# Patient Record
Sex: FEMALE | Race: WHITE | NOT HISPANIC OR LATINO | URBAN - METROPOLITAN AREA
[De-identification: names, ages, dates, MRNs, and addresses within clinical notes are randomized per-mention and may not be internally consistent; named-entity substitution may affect disease eponyms.]

---

## 2017-01-30 RX ORDER — CLOPIDOGREL BISULFATE 75 MG/1
75 TABLET ORAL DAILY
Qty: 90 TABLET | Refills: 1 | Status: SHIPPED | OUTPATIENT
Start: 2017-01-30 | End: 2017-07-30 | Stop reason: SDUPTHER

## 2017-04-18 ENCOUNTER — OFFICE (OUTPATIENT)
Dept: URBAN - METROPOLITAN AREA OTHER 6 | Facility: OTHER | Age: 82
End: 2017-04-18

## 2017-04-18 VITALS
HEART RATE: 85 BPM | HEIGHT: 66 IN | SYSTOLIC BLOOD PRESSURE: 142 MMHG | DIASTOLIC BLOOD PRESSURE: 76 MMHG | WEIGHT: 131 LBS

## 2017-04-18 DIAGNOSIS — Z86.010 PERSONAL HISTORY OF COLONIC POLYPS: ICD-10-CM

## 2017-04-18 DIAGNOSIS — R10.12 LEFT UPPER QUADRANT PAIN: ICD-10-CM

## 2017-04-18 DIAGNOSIS — R14.0 ABDOMINAL DISTENSION (GASEOUS): ICD-10-CM

## 2017-04-18 PROCEDURE — 99213 OFFICE O/P EST LOW 20 MIN: CPT | Performed by: INTERNAL MEDICINE

## 2017-07-31 RX ORDER — CLOPIDOGREL BISULFATE 75 MG/1
TABLET ORAL
Qty: 90 TABLET | Refills: 0 | Status: SHIPPED | OUTPATIENT
Start: 2017-07-31 | End: 2017-10-29 | Stop reason: SDUPTHER

## 2017-10-30 RX ORDER — CLOPIDOGREL BISULFATE 75 MG/1
TABLET ORAL
Qty: 90 TABLET | Refills: 0 | Status: SHIPPED | OUTPATIENT
Start: 2017-10-30 | End: 2018-01-31 | Stop reason: SDUPTHER

## 2018-01-31 RX ORDER — CLOPIDOGREL BISULFATE 75 MG/1
TABLET ORAL
Qty: 90 TABLET | Refills: 0 | Status: SHIPPED | OUTPATIENT
Start: 2018-01-31 | End: 2018-04-28 | Stop reason: SDUPTHER

## 2018-04-30 RX ORDER — CLOPIDOGREL BISULFATE 75 MG/1
TABLET ORAL
Qty: 90 TABLET | Refills: 0 | Status: SHIPPED | OUTPATIENT
Start: 2018-04-30 | End: 2018-07-29 | Stop reason: SDUPTHER

## 2018-07-18 ENCOUNTER — OFFICE VISIT (OUTPATIENT)
Dept: CARDIOLOGY | Facility: CLINIC | Age: 83
End: 2018-07-18

## 2018-07-18 VITALS
HEART RATE: 68 BPM | DIASTOLIC BLOOD PRESSURE: 70 MMHG | BODY MASS INDEX: 20.53 KG/M2 | HEIGHT: 65 IN | SYSTOLIC BLOOD PRESSURE: 160 MMHG | WEIGHT: 123.2 LBS

## 2018-07-18 DIAGNOSIS — R06.09 DYSPNEA ON EXERTION: ICD-10-CM

## 2018-07-18 DIAGNOSIS — R07.2 PRECORDIAL PAIN: ICD-10-CM

## 2018-07-18 DIAGNOSIS — I63.9 CEREBROVASCULAR ACCIDENT (CVA), UNSPECIFIED MECHANISM (HCC): Primary | ICD-10-CM

## 2018-07-18 PROCEDURE — 99214 OFFICE O/P EST MOD 30 MIN: CPT | Performed by: INTERNAL MEDICINE

## 2018-07-18 PROCEDURE — 93000 ELECTROCARDIOGRAM COMPLETE: CPT | Performed by: INTERNAL MEDICINE

## 2018-07-18 NOTE — PROGRESS NOTES
Date of Office Visit: 2018  Encounter Provider: Marlen Brand MD  Place of Service: [facility] CARDIOLOGY  Patient Name: Becky Arceo  :1935      Patient ID:  Becky Arceo is a 83 y.o. female is here for  followup for CVA.         History of Present Illness    She presented to [facility] early 2014 with difficulty moving her  legs, left being much worse than her right. She had a CT of the head done at [facility], which was unremarkable, and she was transferred emergently to [facility] where she was found to have a 3 mm lacunar fusion restriction at the right  lentiform nucleus, which was acute subacute lacunar infarct. There was a chronic ischemic  small vessel disease noted as well. This was indicated for a left lower extremity numbness  and weakness. She did have an MRA done as well, at [facility] done 2014  showing no hemodynamically significant stenosis or aneurysm in the major arteries of the  neck. She also had an MRA done of the cerebral circulation showing no stenosis or aneurysm  of the cerebral and the other circulation there seems to be fine. She went on to have a  transesophageal echocardiogram done 2014, which showed ejection fraction of 74%,  grade 1 diastolic dysfunction, severe atherosclerotic plaque in the descending aorta,  severe atherosclerotic plaque in the aortic arch with mobile plaques noted. There was no  left atrial mass, no thrombus in the left atrial appendage, no evidence of an atrioseptal  defect, and the chamber sizes were fine. She is here because of that.      She has been a vegetarian and exercises regularly. She really is not interested in taking  medications, so we had a long discussion about that.      She continues to have some mild heaviness in her legs. She is left-handed, so she has had  a little bit of difficulty with writing, but she feels like she is almost  back to  baseline. She has resumed exercise. She has a history of renal calculi and gastroesophageal reflux  disease. She has a history of hypothyroidism as well. She has had anemia in the past.      She has never smoked, and she lives with her . They have maintained high activity  levels for quite some time. She is not yet seeing a neurologist for the stroke.       She could not take her statin because she said it caused severe muscle  aching and fatigue, but she is on red yeast rice which she has been able to tolerate.       She also had stress PET study in 10/2014 which looked normal  showing no evidence of ischemia. This was done in response to chest pain.     She continues to see Dr. Lee for her gastrointestinal issues.       She had a normal stress echocardiogram done 10/20/16.    She was in the emergency department at [facility] for anterior chest pain with short windedness with activity.  There her CMP, CBC and chest x-ray are unremarkable.  Her ECG was normal.  I felt this with anxiety and dismissed to home.  She comes in here.  She has stopped many of her activities because she so short winded with activity.  In addition she continues to have heaviness across the anterior chest.  It does not radiate.  It does mostly, with activity and is somewhat better when she rests.  Her energy level is decreased.  She's otherwise taking her medications as directed.  She does have a family history of cardiovascular disease.  She used to be able to exercise on a treadmill but since May, she said she cannot exercise because she is too short winded.  She doesn't feel her heart racing or skipping.  She's had no dizziness or syncope.    Past Medical History:   Diagnosis Date   • Acute pain of left shoulder    • Anxiety    • Balance problem    • Yan's esophagus    • Fatigue    • GERD (gastroesophageal reflux disease)    • Health care maintenance    • History of EKG 08/12/2015   • Hyperlipidemia    •  Hypothyroidism    • Precordial pain    • Stroke (CMS/HCC)    • Vegetarian          Past Surgical History:   Procedure Laterality Date   • ANTERIOR AND PELVIC EXENTERATION     • BLADDER SURGERY     • BREAST BIOPSY     • BREAST SURGERY     • COLONOSCOPY N/A 8/31/2016    Procedure: COLONOSCOPY;  Surgeon: Arsalan Lee MD;  Location: [facility] ENDOSCOPY;  Service:    • ENDOSCOPY N/A 8/31/2016    Procedure: ESOPHAGOGASTRODUODENOSCOPY WITH BIOPSIES (COLD);  Surgeon: Arsalan Lee MD;  Location: [facility] ENDOSCOPY;  Service:    • HYSTERECTOMY     • TONSILLECTOMY         Current Outpatient Prescriptions on File Prior to Visit   Medication Sig Dispense Refill   • aspirin 81 MG tablet Take 1 tablet by mouth Daily.     • cetirizine (ZyrTEC) 10 MG tablet Take  by mouth.     • Cholecalciferol (VITAMIN D3) 5000 UNITS capsule capsule Take 1 capsule by mouth Daily.     • clopidogrel (PLAVIX) 75 MG tablet TAKE 1 TABLET DAILY (NEED TO CALL THE OFFICE TO MAKE AN APPOINTMENT FOR MORE FUTURE REFILLS) 90 tablet 0   • Coenzyme Q10 (COQ10 PO) Take  by mouth.     • [employer] GAS RELIEF ULTRA STRENGTH 180 MG capsule Take 180 mg by mouth As Needed.  5   • escitalopram (LEXAPRO) 5 MG tablet escitalopram 5 mg tablet   Take 1 tablet every day by oral route.     • Garlic 350 MG tablet Take  by mouth.     • Kelp 100 MG tablet Take  by mouth.     • LORazepam (ATIVAN) 1 MG tablet Take 1 tablet by mouth Daily As Needed. For anxiety     • Magnesium 100 MG capsule Take 1 capsule by mouth Daily.     • Multiple Vitamins-Minerals (ALIVE WOMENS 50+ PO) Take 1 tablet by mouth Daily.     • POTASSIUM PO Take  by mouth.     • predniSONE (DELTASONE) 20 MG tablet 1 1/2 tabs daily for 3 days, then 1 tab daily for 3 days 8 tablet 0   • Probiotic Product (PROBIOTIC DAILY) capsule Take  by mouth.     • raNITIdine (ZANTAC) 150 MG tablet Take 150 mg by mouth.     • Red Yeast Rice Extract (RED YEAST RICE PO) Take  by mouth.     • [DISCONTINUED] cyclobenzaprine (FLEXERIL) 5 MG  tablet Take  by mouth.     • [DISCONTINUED] ranitidine (ZANTAC) 150 MG tablet Take 150 mg by mouth 2 (two) times a day.       No current facility-administered medications on file prior to visit.        Social History     Social History   • Marital status:      Spouse name: N/A   • Number of children: N/A   • Years of education: N/A     Occupational History   • Not on file.     Social History Main Topics   • Smoking status: Never Smoker   • Smokeless tobacco: Never Used      Comment: caffeine use   • Alcohol use No   • Drug use: No   • Sexual activity: Defer     Other Topics Concern   • Not on file     Social History Narrative   • No narrative on file           Review of Systems   Constitution: Negative.   HENT: Negative for congestion.    Eyes: Negative for vision loss in left eye and vision loss in right eye.   Respiratory: Negative.  Negative for cough, hemoptysis, shortness of breath, sleep disturbances due to breathing, snoring, sputum production and wheezing.    Endocrine: Negative.    Hematologic/Lymphatic: Negative.    Skin: Negative for poor wound healing and rash.   Musculoskeletal: Negative for falls, gout, muscle cramps and myalgias.   Gastrointestinal: Negative for abdominal pain, diarrhea, dysphagia, hematemesis, melena, nausea and vomiting.   Neurological: Negative for excessive daytime sleepiness, dizziness, headaches, light-headedness, loss of balance, seizures and vertigo.   Psychiatric/Behavioral: Negative for depression and substance abuse. The patient is not nervous/anxious.        Procedures    ECG 12 Lead  Date/Time: 7/18/2018 12:27 PM  Performed by: TIM BORGES  Authorized by: TIM BORGES   Comparison: compared with previous ECG   Similar to previous ECG  Rhythm: sinus rhythm  T depression: V3 and V4  T flattening: V5 and V6  Clinical impression: abnormal ECG                Objective:      Vitals:    07/18/18 1209   BP: 160/70   BP Location: Right arm   Patient  "Position: Sitting   Pulse: 68   Weight: 55.9 kg (123 lb 3.2 oz)   Height: 165.1 cm (65\")     Body mass index is 20.5 kg/m².    Physical Exam   Constitutional: She is oriented to person, place, and time. She appears well-developed and well-nourished. No distress.   HENT:   Head: Normocephalic and atraumatic.   Eyes: Conjunctivae are normal. No scleral icterus.   Neck: Neck supple. No JVD present. Carotid bruit is not present. No thyromegaly present.   Cardiovascular: Normal rate, regular rhythm, S1 normal, S2 normal, normal heart sounds and intact distal pulses.   No extrasystoles are present. PMI is not displaced.  Exam reveals no gallop.    No murmur heard.  Pulses:       Carotid pulses are 2+ on the right side, and 2+ on the left side.       Radial pulses are 2+ on the right side, and 2+ on the left side.        Dorsalis pedis pulses are 2+ on the right side, and 2+ on the left side.        Posterior tibial pulses are 2+ on the right side, and 2+ on the left side.   Pulmonary/Chest: Effort normal and breath sounds normal. No respiratory distress. She has no wheezes. She has no rhonchi. She has no rales. She exhibits no tenderness.   Abdominal: Soft. Bowel sounds are normal. She exhibits no distension, no abdominal bruit and no mass. There is no tenderness.   Musculoskeletal: She exhibits no edema or deformity.   Lymphadenopathy:     She has no cervical adenopathy.   Neurological: She is alert and oriented to person, place, and time. No cranial nerve deficit.   Skin: Skin is warm and dry. No rash noted. She is not diaphoretic. No cyanosis. No pallor. Nails show no clubbing.   Psychiatric: She has a normal mood and affect. Judgment normal.   Vitals reviewed.      Lab Review:       Assessment:      Diagnosis Plan   1. Cerebrovascular accident (CVA), unspecified mechanism (CMS/HCC)       1. Stroke syndrome 06/2014, specifically 06/01/2014 or 06/02/2014. MRA shows no  abnormalities, poor circulation in the brain, and " her carotid arteries looked fine. The  stroke affected the left side. It was a right lacunar stroke. A NAHEED shows a significant  atherosclerotic plaque of the aortic arch with mobile plaques. She requires aspirin, Plavix, blood lipid control, and blood pressure control.  currently, and followup with neurology.   2. Hyperlipidemia. She cannot take Atorvastatin due to muscle pain and refuses other statins.  3. Family history of strokes in her sister.   4. Esophageal reflux, stable. H/o Yan's esophagus.  5. Chest pain, normal PET stress study 10/2014. She now has exertional chest pain with an abnormal ecg, normal stress echo 10/2016. Dyspnea on exertion with chest tightness, worsening since 5/2018, set up CTA chest and stress nuclear study.   6. Abdominal pain, seeing Dr. Lee.      Plan:       No med changes, see rik in 6 weeks.

## 2018-07-24 ENCOUNTER — HOSPITAL ENCOUNTER (OUTPATIENT)
Dept: CARDIOLOGY | Facility: HOSPITAL | Age: 83
Discharge: HOME | End: 2018-07-24
Attending: INTERNAL MEDICINE

## 2018-07-24 ENCOUNTER — HOSPITAL ENCOUNTER (OUTPATIENT)
Dept: CT IMAGING | Facility: HOSPITAL | Age: 83
Discharge: HOME | End: 2018-07-24
Attending: INTERNAL MEDICINE | Admitting: INTERNAL MEDICINE

## 2018-07-24 ENCOUNTER — TRANSCRIBE ORDERS (OUTPATIENT)
Dept: ADMINISTRATIVE | Facility: HOSPITAL | Age: 83
End: 2018-07-24

## 2018-07-24 DIAGNOSIS — Z12.39 BREAST SCREENING: Primary | ICD-10-CM

## 2018-07-24 DIAGNOSIS — R06.09 DYSPNEA ON EXERTION: ICD-10-CM

## 2018-07-24 DIAGNOSIS — R07.2 PRECORDIAL PAIN: ICD-10-CM

## 2018-07-24 LAB
BH CV NUCLEAR PRIOR STUDY: 2
BH CV STRESS BP STAGE 1: NORMAL
BH CV STRESS COMMENTS STAGE 1: NORMAL
BH CV STRESS DOSE REGADENOSON STAGE 1: 0.4
BH CV STRESS DURATION MIN STAGE 1: 0
BH CV STRESS DURATION SEC STAGE 1: 10
BH CV STRESS HR STAGE 1: 122
BH CV STRESS PROTOCOL 1: NORMAL
BH CV STRESS RECOVERY BP: NORMAL MMHG
BH CV STRESS RECOVERY HR: 107 BPM
BH CV STRESS STAGE 1: 1
CREAT BLDA-MCNC: 0.7 MG/DL (ref 0.6–1.3)
LV EF NUC BP: 67 %
MAXIMAL PREDICTED HEART RATE: 138 BPM
PERCENT MAX PREDICTED HR: 88.41 %
STRESS BASELINE BP: NORMAL MMHG
STRESS BASELINE HR: 86 BPM
STRESS PERCENT HR: 104 %
STRESS POST EXERCISE DUR SEC: 10 SEC
STRESS POST PEAK BP: NORMAL MMHG
STRESS POST PEAK HR: 122 BPM
STRESS TARGET HR: 117 BPM

## 2018-07-24 PROCEDURE — 93016 CV STRESS TEST SUPVJ ONLY: CPT | Performed by: INTERNAL MEDICINE

## 2018-07-24 PROCEDURE — 78452 HT MUSCLE IMAGE SPECT MULT: CPT | Performed by: INTERNAL MEDICINE

## 2018-07-24 PROCEDURE — 93017 CV STRESS TEST TRACING ONLY: CPT

## 2018-07-24 PROCEDURE — 0 TECHNETIUM TETROFOSMIN KIT: Performed by: INTERNAL MEDICINE

## 2018-07-24 PROCEDURE — 93018 CV STRESS TEST I&R ONLY: CPT | Performed by: INTERNAL MEDICINE

## 2018-07-24 PROCEDURE — A9502 TC99M TETROFOSMIN: HCPCS | Performed by: INTERNAL MEDICINE

## 2018-07-24 PROCEDURE — 78452 HT MUSCLE IMAGE SPECT MULT: CPT

## 2018-07-24 PROCEDURE — 25010000002 REGADENOSON 0.4 MG/5ML SOLUTION: Performed by: INTERNAL MEDICINE

## 2018-07-24 PROCEDURE — 71275 CT ANGIOGRAPHY CHEST: CPT

## 2018-07-24 PROCEDURE — 25010000002 IOPAMIDOL 61 % SOLUTION: Performed by: INTERNAL MEDICINE

## 2018-07-24 PROCEDURE — 82565 ASSAY OF CREATININE: CPT

## 2018-07-24 RX ADMIN — TETROFOSMIN 1 DOSE: 1.38 INJECTION, POWDER, LYOPHILIZED, FOR SOLUTION INTRAVENOUS at 11:49

## 2018-07-24 RX ADMIN — REGADENOSON 0.4 MG: 0.08 INJECTION, SOLUTION INTRAVENOUS at 11:48

## 2018-07-24 RX ADMIN — IOPAMIDOL 95 ML: 612 INJECTION, SOLUTION INTRAVENOUS at 13:16

## 2018-07-24 RX ADMIN — TETROFOSMIN 1 DOSE: 1.38 INJECTION, POWDER, LYOPHILIZED, FOR SOLUTION INTRAVENOUS at 10:45

## 2018-07-24 NOTE — NURSING NOTE
Dr. Byers called x ray triage and spoke with Noni Orosco RN.  She said the patient may go.  The patient was informed and told to follow up with Dr. Brand. Her IV was D/Jas and site held until hemostasis and guaze and Co Ban was applied.

## 2018-07-30 ENCOUNTER — HOSPITAL ENCOUNTER (OUTPATIENT)
Dept: MAMMOGRAPHY | Facility: HOSPITAL | Age: 83
Discharge: HOME | End: 2018-07-30
Admitting: FAMILY MEDICINE

## 2018-07-30 DIAGNOSIS — Z12.39 BREAST SCREENING: ICD-10-CM

## 2018-07-30 PROCEDURE — 77067 SCR MAMMO BI INCL CAD: CPT

## 2018-07-30 PROCEDURE — 77063 BREAST TOMOSYNTHESIS BI: CPT

## 2018-07-30 RX ORDER — CLOPIDOGREL BISULFATE 75 MG/1
75 TABLET ORAL DAILY
Qty: 90 TABLET | Refills: 1 | Status: SHIPPED | OUTPATIENT
Start: 2018-07-30 | End: 2018-10-11

## 2018-09-05 NOTE — PROGRESS NOTES
Date of Office Visit: 2018  Encounter Provider: CHEIKH Arreguin  Place of Service: [facility] CARDIOLOGY  Patient Name: Becky Arceo  :1935    Chief Complaint   Patient presents with   • Chest Pain   :     HPI: Becky Arceo is a 83 y.o. female who presents today for cardiac follow up.  She is a new patient to me and her previous records have been reviewed.  She is , very active, never smokes cigarettes, and is a vegetarian.    She has a past medical history of anxiety, GERD, Yan's esophagus, hyperlipidemia, and thyroid disease.  In 2014 she was diagnosed with a 3 mm with container fusion restriction at the right lentiform nucleus and consistent with an acute lacunar infarct and chronic ischemic small vessel disease was noted.  She had a NAHEED completed at that time which revealed an EF of 74%, grade 1 diastolic dysfunction, severe atherosclerotic plaque in the descending aorta and aortic arch with mobile plaques noted.  She was then recommended to establish care with Dr. Marlen Brand.  She was recommended to take aspirin, clopidogrel, and have good blood pressure and cholesterol control.  She has been intolerant to statin therapy in the past.  She had a normal cardiac PET scan in 2014 and normal stress echocardiogram in 2016.    She was recently evaluated in 2018 by Dr. Marlen Brand after she had presented to [facility] for anterior chest pain and shortness of breath, both new.  She was diagnosed with anxiety and dismissed to home.  She started avoiding activities in which she knew she would experience the chest pain and shortness of breath.  Dr. Brand recommended a CTA of the chest and nuclear stress study to be completed.    CTA of the chest completed 18 showed no evidence of pulmonary emboli, 3.8 cm at take easy of the ascending thoracic aorta and a tiny hiatal hernia.  Cardial perfusion  stress test completed 7/24/18 showed a small-sized infarct in the apical region and no ischemia noted with an EF of 67%.  Overall impression consistent with a low risk study.    She presents today for follow-up with her  accompanying her.  He notes over the past 3 months she started having this chest tightness and shortness of breath with exertion.  She has continued to have the symptoms and they resolve with rest.  She denies any other associated symptoms such as jaw pain, back pain, numbness, or tingling.  She was also started to notice some new onset palpitations in which she will fill a brief fast heartbeat with no other associated symptoms.  She had this in the past and started taking magnesium once again.  She has some lightheadedness, but denies syncope.  She also describes an epigastric discomfort that occurs after eating.  She has a decreased appetite because of abdominal bloating.  Her blood pressure and heart rate are both normal today.  She has been compliant with her aspirin and clopidogrel therapy.    The following portion of the patient's history were reviewed and updated as appropriate: past medical history, past surgical history, past social history, past family history, allergies, current medications, and problem list.    Past Medical History:   Diagnosis Date   • Acute pain of left shoulder    • Anxiety    • Atherosclerosis of aorta (CMS/HCC) 9/6/2018   • Balance problem    • Yan's esophagus    • Fatigue    • GERD (gastroesophageal reflux disease)    • Health care maintenance    • History of EKG 08/12/2015   • Hyperlipidemia    • Hypothyroidism    • Precordial pain    • Stroke (CMS/HCC)    • Vegetarian        Past Surgical History:   Procedure Laterality Date   • ANTERIOR AND PELVIC EXENTERATION     • BLADDER SURGERY     • BREAST BIOPSY     • BREAST SURGERY     • COLONOSCOPY N/A 8/31/2016    Procedure: COLONOSCOPY;  Surgeon: Arsalan Lee MD;  Location: [facility] ENDOSCOPY;  Service:     • ENDOSCOPY N/A 8/31/2016    Procedure: ESOPHAGOGASTRODUODENOSCOPY WITH BIOPSIES (COLD);  Surgeon: Arsalan Lee MD;  Location: [facility] ENDOSCOPY;  Service:    • HYSTERECTOMY     • TONSILLECTOMY         Social History     Social History   • Marital status:      Spouse name: N/A   • Number of children: N/A   • Years of education: N/A     Occupational History   • Not on file.     Social History Main Topics   • Smoking status: Never Smoker   • Smokeless tobacco: Never Used      Comment: caffeine use   • Alcohol use No   • Drug use: No   • Sexual activity: Defer       Family History   Problem Relation Age of Onset   • Heart failure Mother    • Stroke Sister    • Hypertension Sister    • Rheumatic fever Brother        Review of Systems   Constitution: Positive for malaise/fatigue. Negative for chills, diaphoresis, fever, night sweats, weight gain and weight loss.   HENT: Negative for hearing loss, nosebleeds, sore throat and tinnitus.    Eyes: Negative for blurred vision, double vision, pain and visual disturbance.   Cardiovascular: Positive for chest pain and dyspnea on exertion. Negative for claudication, cyanosis, irregular heartbeat, leg swelling, near-syncope, orthopnea, palpitations, paroxysmal nocturnal dyspnea and syncope.   Respiratory: Negative for cough, hemoptysis, shortness of breath, snoring and wheezing.    Endocrine: Negative for cold intolerance, heat intolerance and polyuria.   Hematologic/Lymphatic: Negative for bleeding problem. Does not bruise/bleed easily.   Skin: Negative for color change, dry skin, flushing and itching.   Musculoskeletal: Negative for falls, joint pain, joint swelling, muscle cramps, muscle weakness and myalgias.   Gastrointestinal: Positive for bloating and abdominal pain. Negative for constipation, heartburn, melena, nausea and vomiting.   Genitourinary: Negative for dysuria and hematuria.   Neurological: Negative for excessive daytime sleepiness, dizziness,  "light-headedness, loss of balance, numbness, paresthesias, seizures and vertigo.   Psychiatric/Behavioral: Negative for altered mental status, depression, memory loss and substance abuse. The patient does not have insomnia and is not nervous/anxious.    Allergic/Immunologic: Negative for environmental allergies.       Allergies   Allergen Reactions   • Amoxicillin Itching   • Cephalexin Unknown (See Comments)     Unknown     • Penicillins Unknown (See Comments)     unknown   • Promethazine    • Doxycycline Anxiety     PT had \"a bad attack\" on this medication.  Told not to take this medication again.          Current Outpatient Prescriptions:   •  aspirin 81 MG tablet, Take 1 tablet by mouth Daily., Disp: , Rfl:   •  clopidogrel (PLAVIX) 75 MG tablet, Take 1 tablet by mouth Daily., Disp: 90 tablet, Rfl: 1  •  Coenzyme Q10 (COQ10 PO), Take  by mouth., Disp: , Rfl:   •  [employer] GAS RELIEF ULTRA STRENGTH 180 MG capsule, Take 180 mg by mouth As Needed., Disp: , Rfl: 5  •  Garlic 350 MG tablet, Take  by mouth., Disp: , Rfl:   •  Kelp 100 MG tablet, Take  by mouth., Disp: , Rfl:   •  LORazepam (ATIVAN) 1 MG tablet, Take 1 tablet by mouth Daily As Needed. For anxiety, Disp: , Rfl:   •  Magnesium 100 MG capsule, Take 1 capsule by mouth Daily., Disp: , Rfl:   •  POTASSIUM PO, Take  by mouth., Disp: , Rfl:   •  Probiotic Product (PROBIOTIC DAILY) capsule, Take  by mouth., Disp: , Rfl:   •  raNITIdine (ZANTAC) 150 MG tablet, Take 150 mg by mouth., Disp: , Rfl:   •  Red Yeast Rice Extract (RED YEAST RICE PO), Take  by mouth., Disp: , Rfl:   •  ranolazine (RANEXA) 500 MG 12 hr tablet, Take 1 tablet by mouth Every 12 (Twelve) Hours., Disp: 60 tablet, Rfl: 2      Objective:     Vitals:    09/06/18 1245   BP: 110/60   Pulse: 66   Weight: 54.7 kg (120 lb 9.6 oz)   Height: 165.1 cm (65\")     Body mass index is 20.07 kg/m².    PHYSICAL EXAM:    Vitals Reviewed.   General Appearance: No acute distress, well developed and well nourished.  " Thin.  Eyes: Conjunctiva and lids: No erythema, swelling, or discharge. Sclera non-icteric.   HENT: Atraumatic, normocephalic. External eyes, ears, and nose normal. No hearing loss noted. Mucous membranes normal. Lips not cyanotic. Neck supple with no tenderness.  Respiratory: No signs of respiratory distress. Respiration rhythm and depth normal.   Clear to auscultation. No rales, crackles, rhonchi, or wheezing auscultated.   Cardiovascular:  Jugular Venous Pressure: Normal  Heart Rate and Rhythm: Normal, Heart Sounds: Normal S1 and S2. No S3 or S4 noted.  Murmurs: No murmurs noted. No rubs, thrills, or gallops.   Arterial Pulses: Carotid pulses normal. No carotid bruit noted. Posterior tibialis and dorsalis pedis pulses normal.   Lower Extremities: No edema noted.  Gastrointestinal:  Abdomen soft, non-distended, non-tender. Normal bowel sounds. No hepatomegaly.   Musculoskeletal: Normal movement of extremities  Skin and Nails: General appearance normal. No pallor, cyanosis, diaphoresis. Skin temperature normal. No clubbing of fingernails.   Psychiatric: Patient alert and oriented to person, place, and time. Speech and behavior appropriate. Normal mood and affect.       ECG 12 Lead  Date/Time: 9/6/2018 12:39 PM  Performed by: CLIFF DURON  Authorized by: CLIFF DURON   Comparison: compared with previous ECG from 7/18/2018  Similar to previous ECG  Rhythm: sinus rhythm  Rate: normal  BPM: 66  Conduction: conduction normal  ST Depression: V1-V6  T depression: III, V3, V4 and V5  QRS axis: normal  Clinical impression: abnormal ECG  Comments: 0.05 ST segment depression  T-wave abnormalities/inversions noted  EKG tracing unchanged              Assessment:       Diagnosis Plan   1. Precordial pain  Adult Transthoracic Echo Complete W/ Cont if Necessary Per Protocol   2. Dyspnea on exertion  Adult Transthoracic Echo Complete W/ Cont if Necessary Per Protocol   3. Abnormal EKG  Adult Transthoracic Echo Complete W/  Cont if Necessary Per Protocol   4. Atherosclerosis of aorta (CMS/[facility])     5. Cerebrovascular accident (CVA), unspecified mechanism (CMS/HCC)            Plan:       1.  Chest Pain and Dyspnea on Exertion: She continues to experience typical anginal symptoms of chest tightness and dyspnea upon exertion that resolves with rest.  Her recent nuclear stress test showed a possible apical infarct and no new ischemia.  Dr. Brand discussed her recent symptoms and recommendations with the patient.  She offered proceeding with a cardiac catheterization and the patient is very hesitant to have this procedure completed because of the severe plaque in her aorta which would increase her risk for possible stroke.  Dr. Brand said if she does not want to proceed with a cardiac catheterization at this time, then she would recommend medical treatment with ranolazine 500 mg one tablet twice a day (samples were provided).  The patient prefers the medical management option.  She will continue with the aspirin and clopidogrel.  In the event that she has any worsening symptoms, she needs to contact our office or present to the emergency department immediately.  We have also recommend an echocardiogram to reassess the apical abnormality noted on the stress test.    2.  Abnormal EKG: Mild ST depression and T-wave abnormalities noted on EKG in July and September, unchanged.    3.  Atherosclerosis of Aorta: Noted per NAHEED in 2014 that she had severe plaque of her descending aorta as well as the aortic root.  Dr. Brand is aware of this.    4.  CVA: Previous lacunar stroke in 2014.  She has been maintained on aspirin and clopidogrel.    5.  Further recommendations to follow based on cardiac results of echocardiogram.  Follow-up with Dr. Brand in 6 weeks.     As always, it has been a pleasure to participate in your patient's care.      Sincerely,         CHEIKH Moreno

## 2018-09-06 ENCOUNTER — OFFICE VISIT (OUTPATIENT)
Dept: CARDIOLOGY | Facility: CLINIC | Age: 83
End: 2018-09-06

## 2018-09-06 VITALS
SYSTOLIC BLOOD PRESSURE: 110 MMHG | HEART RATE: 66 BPM | DIASTOLIC BLOOD PRESSURE: 60 MMHG | BODY MASS INDEX: 20.09 KG/M2 | WEIGHT: 120.6 LBS | HEIGHT: 65 IN

## 2018-09-06 DIAGNOSIS — R94.31 ABNORMAL EKG: ICD-10-CM

## 2018-09-06 DIAGNOSIS — R06.09 DYSPNEA ON EXERTION: ICD-10-CM

## 2018-09-06 DIAGNOSIS — I70.0 ATHEROSCLEROSIS OF AORTA (HCC): ICD-10-CM

## 2018-09-06 DIAGNOSIS — R07.2 PRECORDIAL PAIN: Primary | ICD-10-CM

## 2018-09-06 DIAGNOSIS — I63.9 CEREBROVASCULAR ACCIDENT (CVA), UNSPECIFIED MECHANISM (HCC): ICD-10-CM

## 2018-09-06 PROBLEM — K22.70 BARRETT'S ESOPHAGUS: Status: ACTIVE | Noted: 2018-03-05

## 2018-09-06 PROBLEM — K21.9 GASTROESOPHAGEAL REFLUX DISEASE: Status: ACTIVE | Noted: 2018-03-05

## 2018-09-06 PROCEDURE — 99215 OFFICE O/P EST HI 40 MIN: CPT | Performed by: NURSE PRACTITIONER

## 2018-09-06 PROCEDURE — 93000 ELECTROCARDIOGRAM COMPLETE: CPT | Performed by: NURSE PRACTITIONER

## 2018-09-06 RX ORDER — RANOLAZINE 500 MG/1
500 TABLET, EXTENDED RELEASE ORAL EVERY 12 HOURS SCHEDULED
Qty: 84 TABLET | Refills: 0 | COMMUNITY
Start: 2018-09-06 | End: 2020-06-12

## 2018-09-06 RX ORDER — RANOLAZINE 500 MG/1
500 TABLET, EXTENDED RELEASE ORAL EVERY 12 HOURS SCHEDULED
Qty: 60 TABLET | Refills: 2 | Status: SHIPPED | OUTPATIENT
Start: 2018-09-06 | End: 2018-09-06 | Stop reason: SDUPTHER

## 2018-09-09 ENCOUNTER — HOSPITAL ENCOUNTER (OUTPATIENT)
Facility: HOSPITAL | Age: 83
Setting detail: OBSERVATION
Discharge: HOME | End: 2018-09-10
Attending: FAMILY MEDICINE | Admitting: FAMILY MEDICINE

## 2018-09-09 ENCOUNTER — APPOINTMENT (OUTPATIENT)
Dept: CT IMAGING | Facility: HOSPITAL | Age: 83
End: 2018-09-09

## 2018-09-09 DIAGNOSIS — R26.2 DIFFICULTY WALKING: ICD-10-CM

## 2018-09-09 DIAGNOSIS — R07.89 ATYPICAL CHEST PAIN: ICD-10-CM

## 2018-09-09 DIAGNOSIS — G45.9 TRANSIENT CEREBRAL ISCHEMIA, UNSPECIFIED TYPE: Primary | ICD-10-CM

## 2018-09-09 PROBLEM — R29.898 LEFT ARM WEAKNESS: Status: ACTIVE | Noted: 2018-09-09

## 2018-09-09 PROBLEM — Z86.73 HISTORY OF LACUNAR CEREBROVASCULAR ACCIDENT: Status: ACTIVE | Noted: 2018-09-09

## 2018-09-09 LAB
ALBUMIN SERPL-MCNC: 4.5 G/DL (ref 3.5–5.2)
ALBUMIN/GLOB SERPL: 1.7 G/DL
ALP SERPL-CCNC: 61 U/L (ref 39–117)
ALT SERPL W P-5'-P-CCNC: 14 U/L (ref 1–33)
ANION GAP SERPL CALCULATED.3IONS-SCNC: 11.9 MMOL/L
AST SERPL-CCNC: 21 U/L (ref 1–32)
BASOPHILS # BLD AUTO: 0.01 10*3/MM3 (ref 0–0.2)
BASOPHILS NFR BLD AUTO: 0.2 % (ref 0–1.5)
BILIRUB SERPL-MCNC: 0.9 MG/DL (ref 0.1–1.2)
BUN BLD-MCNC: 11 MG/DL (ref 8–23)
BUN/CREAT SERPL: 12.6 (ref 7–25)
CALCIUM SPEC-SCNC: 9.2 MG/DL (ref 8.6–10.5)
CHLORIDE SERPL-SCNC: 101 MMOL/L (ref 98–107)
CO2 SERPL-SCNC: 26.1 MMOL/L (ref 22–29)
CREAT BLD-MCNC: 0.87 MG/DL (ref 0.57–1)
DEPRECATED RDW RBC AUTO: 43.1 FL (ref 37–54)
EOSINOPHIL # BLD AUTO: 0.14 10*3/MM3 (ref 0–0.7)
EOSINOPHIL NFR BLD AUTO: 2.8 % (ref 0.3–6.2)
ERYTHROCYTE [DISTWIDTH] IN BLOOD BY AUTOMATED COUNT: 13 % (ref 11.7–13)
GFR SERPL CREATININE-BSD FRML MDRD: 62 ML/MIN/1.73
GLOBULIN UR ELPH-MCNC: 2.7 GM/DL
GLUCOSE BLD-MCNC: 83 MG/DL (ref 65–99)
HCT VFR BLD AUTO: 41.9 % (ref 35.6–45.5)
HGB BLD-MCNC: 13.9 G/DL (ref 11.9–15.5)
HOLD SPECIMEN: NORMAL
HOLD SPECIMEN: NORMAL
IMM GRANULOCYTES # BLD: 0.01 10*3/MM3 (ref 0–0.03)
IMM GRANULOCYTES NFR BLD: 0.2 % (ref 0–0.5)
LYMPHOCYTES # BLD AUTO: 1.76 10*3/MM3 (ref 0.9–4.8)
LYMPHOCYTES NFR BLD AUTO: 35.3 % (ref 19.6–45.3)
MCH RBC QN AUTO: 30.1 PG (ref 26.9–32)
MCHC RBC AUTO-ENTMCNC: 33.2 G/DL (ref 32.4–36.3)
MCV RBC AUTO: 90.7 FL (ref 80.5–98.2)
MONOCYTES # BLD AUTO: 0.35 10*3/MM3 (ref 0.2–1.2)
MONOCYTES NFR BLD AUTO: 7 % (ref 5–12)
NEUTROPHILS # BLD AUTO: 2.72 10*3/MM3 (ref 1.9–8.1)
NEUTROPHILS NFR BLD AUTO: 54.7 % (ref 42.7–76)
PLATELET # BLD AUTO: 277 10*3/MM3 (ref 140–500)
PMV BLD AUTO: 10 FL (ref 6–12)
POTASSIUM BLD-SCNC: 4.2 MMOL/L (ref 3.5–5.2)
PROT SERPL-MCNC: 7.2 G/DL (ref 6–8.5)
RBC # BLD AUTO: 4.62 10*6/MM3 (ref 3.9–5.2)
SODIUM BLD-SCNC: 139 MMOL/L (ref 136–145)
TROPONIN T SERPL-MCNC: <0.01 NG/ML (ref 0–0.03)
TROPONIN T SERPL-MCNC: <0.01 NG/ML (ref 0–0.03)
WBC NRBC COR # BLD: 4.98 10*3/MM3 (ref 4.5–10.7)
WHOLE BLOOD HOLD SPECIMEN: NORMAL
WHOLE BLOOD HOLD SPECIMEN: NORMAL

## 2018-09-09 PROCEDURE — G0378 HOSPITAL OBSERVATION PER HR: HCPCS

## 2018-09-09 PROCEDURE — 99285 EMERGENCY DEPT VISIT HI MDM: CPT

## 2018-09-09 PROCEDURE — 80053 COMPREHEN METABOLIC PANEL: CPT | Performed by: FAMILY MEDICINE

## 2018-09-09 PROCEDURE — 85025 COMPLETE CBC W/AUTO DIFF WBC: CPT | Performed by: FAMILY MEDICINE

## 2018-09-09 PROCEDURE — 84484 ASSAY OF TROPONIN QUANT: CPT | Performed by: FAMILY MEDICINE

## 2018-09-09 PROCEDURE — 25010000002 LORAZEPAM PER 2 MG: Performed by: FAMILY MEDICINE

## 2018-09-09 PROCEDURE — 84443 ASSAY THYROID STIM HORMONE: CPT | Performed by: INTERNAL MEDICINE

## 2018-09-09 PROCEDURE — 96374 THER/PROPH/DIAG INJ IV PUSH: CPT

## 2018-09-09 PROCEDURE — 93010 ELECTROCARDIOGRAM REPORT: CPT | Performed by: INTERNAL MEDICINE

## 2018-09-09 PROCEDURE — 93005 ELECTROCARDIOGRAM TRACING: CPT | Performed by: FAMILY MEDICINE

## 2018-09-09 PROCEDURE — 96361 HYDRATE IV INFUSION ADD-ON: CPT

## 2018-09-09 PROCEDURE — 84484 ASSAY OF TROPONIN QUANT: CPT | Performed by: INTERNAL MEDICINE

## 2018-09-09 PROCEDURE — 70450 CT HEAD/BRAIN W/O DYE: CPT

## 2018-09-09 RX ORDER — CLOPIDOGREL BISULFATE 75 MG/1
75 TABLET ORAL DAILY
Status: DISCONTINUED | OUTPATIENT
Start: 2018-09-09 | End: 2018-09-10 | Stop reason: HOSPADM

## 2018-09-09 RX ORDER — ASPIRIN 300 MG/1
300 SUPPOSITORY RECTAL DAILY
Status: DISCONTINUED | OUTPATIENT
Start: 2018-09-09 | End: 2018-09-10 | Stop reason: HOSPADM

## 2018-09-09 RX ORDER — SODIUM CHLORIDE 9 MG/ML
75 INJECTION, SOLUTION INTRAVENOUS CONTINUOUS
Status: DISCONTINUED | OUTPATIENT
Start: 2018-09-09 | End: 2018-09-10 | Stop reason: HOSPADM

## 2018-09-09 RX ORDER — BISACODYL 5 MG/1
5 TABLET, DELAYED RELEASE ORAL DAILY PRN
Status: DISCONTINUED | OUTPATIENT
Start: 2018-09-09 | End: 2018-09-10 | Stop reason: HOSPADM

## 2018-09-09 RX ORDER — SODIUM CHLORIDE 0.9 % (FLUSH) 0.9 %
1-10 SYRINGE (ML) INJECTION AS NEEDED
Status: DISCONTINUED | OUTPATIENT
Start: 2018-09-09 | End: 2018-09-10 | Stop reason: HOSPADM

## 2018-09-09 RX ORDER — ATORVASTATIN CALCIUM 20 MG/1
40 TABLET, FILM COATED ORAL NIGHTLY
Status: DISCONTINUED | OUTPATIENT
Start: 2018-09-09 | End: 2018-09-10

## 2018-09-09 RX ORDER — NITROGLYCERIN 0.4 MG/1
0.4 TABLET SUBLINGUAL
Status: DISCONTINUED | OUTPATIENT
Start: 2018-09-09 | End: 2018-09-10 | Stop reason: HOSPADM

## 2018-09-09 RX ORDER — RANOLAZINE 500 MG/1
500 TABLET, EXTENDED RELEASE ORAL EVERY 12 HOURS SCHEDULED
Status: DISCONTINUED | OUTPATIENT
Start: 2018-09-09 | End: 2018-09-10 | Stop reason: HOSPADM

## 2018-09-09 RX ORDER — LORAZEPAM 1 MG/1
1 TABLET ORAL DAILY PRN
Status: DISCONTINUED | OUTPATIENT
Start: 2018-09-09 | End: 2018-09-10 | Stop reason: HOSPADM

## 2018-09-09 RX ORDER — FAMOTIDINE 20 MG/1
20 TABLET, FILM COATED ORAL DAILY
Status: DISCONTINUED | OUTPATIENT
Start: 2018-09-09 | End: 2018-09-10 | Stop reason: HOSPADM

## 2018-09-09 RX ORDER — ATORVASTATIN CALCIUM 80 MG/1
80 TABLET, FILM COATED ORAL NIGHTLY
Status: DISCONTINUED | OUTPATIENT
Start: 2018-09-09 | End: 2018-09-09

## 2018-09-09 RX ORDER — CALCIUM CARBONATE 200(500)MG
2 TABLET,CHEWABLE ORAL 2 TIMES DAILY PRN
Status: DISCONTINUED | OUTPATIENT
Start: 2018-09-09 | End: 2018-09-10 | Stop reason: HOSPADM

## 2018-09-09 RX ORDER — ALPRAZOLAM 0.25 MG/1
0.5 TABLET ORAL ONCE
Status: COMPLETED | OUTPATIENT
Start: 2018-09-09 | End: 2018-09-09

## 2018-09-09 RX ORDER — SIMETHICONE 180 MG
180 CAPSULE ORAL 4 TIMES DAILY PRN
Status: DISCONTINUED | OUTPATIENT
Start: 2018-09-09 | End: 2018-09-10 | Stop reason: HOSPADM

## 2018-09-09 RX ORDER — ONDANSETRON 2 MG/ML
4 INJECTION INTRAMUSCULAR; INTRAVENOUS EVERY 6 HOURS PRN
Status: DISCONTINUED | OUTPATIENT
Start: 2018-09-09 | End: 2018-09-10 | Stop reason: HOSPADM

## 2018-09-09 RX ORDER — ACETAMINOPHEN 325 MG/1
650 TABLET ORAL EVERY 4 HOURS PRN
Status: DISCONTINUED | OUTPATIENT
Start: 2018-09-09 | End: 2018-09-10 | Stop reason: HOSPADM

## 2018-09-09 RX ORDER — ACETAMINOPHEN 650 MG/1
650 SUPPOSITORY RECTAL EVERY 4 HOURS PRN
Status: DISCONTINUED | OUTPATIENT
Start: 2018-09-09 | End: 2018-09-10 | Stop reason: HOSPADM

## 2018-09-09 RX ORDER — ONDANSETRON 4 MG/1
4 TABLET, ORALLY DISINTEGRATING ORAL EVERY 6 HOURS PRN
Status: DISCONTINUED | OUTPATIENT
Start: 2018-09-09 | End: 2018-09-10 | Stop reason: HOSPADM

## 2018-09-09 RX ORDER — ONDANSETRON 4 MG/1
4 TABLET, FILM COATED ORAL EVERY 6 HOURS PRN
Status: DISCONTINUED | OUTPATIENT
Start: 2018-09-09 | End: 2018-09-10 | Stop reason: HOSPADM

## 2018-09-09 RX ORDER — ASPIRIN 325 MG
325 TABLET ORAL DAILY
Status: DISCONTINUED | OUTPATIENT
Start: 2018-09-09 | End: 2018-09-10 | Stop reason: HOSPADM

## 2018-09-09 RX ORDER — LORAZEPAM 2 MG/ML
1 INJECTION INTRAMUSCULAR ONCE
Status: COMPLETED | OUTPATIENT
Start: 2018-09-09 | End: 2018-09-09

## 2018-09-09 RX ADMIN — CLOPIDOGREL 75 MG: 75 TABLET, FILM COATED ORAL at 20:10

## 2018-09-09 RX ADMIN — RANOLAZINE 500 MG: 500 TABLET, FILM COATED, EXTENDED RELEASE ORAL at 20:09

## 2018-09-09 RX ADMIN — ATORVASTATIN CALCIUM 40 MG: 20 TABLET, FILM COATED ORAL at 20:09

## 2018-09-09 RX ADMIN — ALPRAZOLAM 0.5 MG: 0.25 TABLET ORAL at 14:22

## 2018-09-09 RX ADMIN — SODIUM CHLORIDE 75 ML/HR: 9 INJECTION, SOLUTION INTRAVENOUS at 18:59

## 2018-09-09 RX ADMIN — FAMOTIDINE 20 MG: 20 TABLET, FILM COATED ORAL at 20:09

## 2018-09-09 RX ADMIN — ASPIRIN 325 MG: 325 TABLET ORAL at 20:10

## 2018-09-09 RX ADMIN — LORAZEPAM 1 MG: 2 INJECTION INTRAMUSCULAR; INTRAVENOUS at 14:21

## 2018-09-09 NOTE — ED NOTES
Pt states around 1130 she could no control her left arm when she was trying to comb her hair.  Pt states that her left arm feels like it is back to normal.  Pt states that she has had a stroke in the past.   Pt states that her left leg feels heavy.     Naila Nguyen, KATIA  09/09/18 [id number]

## 2018-09-09 NOTE — ED NOTES
Pt in imaging when RN entered room for specimen collection and med admin.     Alexx Man, RN  09/09/18 1430

## 2018-09-09 NOTE — PROGRESS NOTES
Discharge Planning Assessment  [facility]     Patient Name: Becky Arceo  MRN: [id number]  Today's Date: 9/9/2018    Admit Date: 9/9/2018          Discharge Needs Assessment     Row Name 09/09/18 1446       Living Environment    Lives With spouse    Name(s) of Who Lives With Patient --   Guzman    Current Living Arrangements home/apartment/condo    Duration at Residence --   41 years    Potentially Unsafe Housing Conditions --   none    Primary Care Provided by self    Provides Primary Care For no one    Caregiving Concerns --   Pt cares for herself and  helps if needed    Family Caregiver if Needed spouse    Family Caregiver Names --   Guzman    Quality of Family Relationships involved;helpful;supportive    Able to Return to Prior Arrangements yes    Living Arrangement Comments --   pt lives in a house with her        Resource/Environmental Concerns    Resource/Environmental Concerns none    Transportation Concerns --   none       Transition Planning    Patient/Family Anticipates Transition to home    Patient/Family Anticipated Services at Transition none    Transportation Anticipated family or friend will provide    can provide ride home       Discharge Needs Assessment    Readmission Within the Last 30 Days no previous admission in last 30 days    Concerns to be Addressed no discharge needs identified    Concerns Comments --   none    Equipment Currently Used at Home none    Anticipated Changes Related to Illness none    Equipment Needed After Discharge none    Outpatient/Agency/Support Group Needs --   none    Discharge Facility/Level of Care Needs --   none    Offered/Gave Vendor List yes   offered resources pt and  both deny the need for them at this time.    Patient's Choice of Community Agency(s) --   none    Current Discharge Risk --   none    Discharge Coordination/Progress --   Pt being admitted as obs and plans on returning home with her . No needs identified at  this time.            Discharge Plan     Row Name 09/09/18 1443       Plan    Patient/Family in Agreement with Plan yes    Guzman present during assessment    Plan Comments --   Pt plans on returning home with her  upon discharge from hospital.        Destination     No service coordination in this encounter.      Durable Medical Equipment     No service coordination in this encounter.      Dialysis/Infusion     No service coordination in this encounter.      Home Medical Care     No service coordination in this encounter.      Social Care     No service coordination in this encounter.                Demographic Summary    No documentation.           Functional Status    No documentation.           Psychosocial    No documentation.           Abuse/Neglect    No documentation.           Legal    No documentation.           Substance Abuse    No documentation.           Patient Forms    No documentation.         Janna Garcia RN

## 2018-09-09 NOTE — ED PROVIDER NOTES
" EMERGENCY DEPARTMENT ENCOUNTER    CHIEF COMPLAINT  Chief Complaint: abnormal coordination  History given by: patient, spouse  History limited by: nothing  Room Number: 27/27  PMD: Giovanni Jones MD  Cardiologist- Dr. Brand    HPI:  Pt is a 83 y.o. female who presents complaining of abnormal coordination of her LUE that she first noticed while brushing her hair with her LUE around 1130 today. Pt's spouse states that the pt's symptoms resolved after about 5 minutes but denies the pt having any trouble talking, facial weakness or trouble ambulating. Pt denies trouble ambulating but states that her left leg feels \"heavy\". Pt also complains of intermittent, fleeting, left, sharp lower chest discomfort that began last night. Pt states that her prior CVA was in 2014 and she presented with left hand numbness and LLE weakness. Pt is on Plavix for prior CVA and that that she took 325mg ASA x2 PTA.    Duration:  5 minutes  Onset: gradual  Timing: constant  Location: LUE  Radiation: none  Quality: abnormal coordination  Intensity/Severity: moderate  Progression: resolved  Associated Symptoms: LLE \"heaviness\", CP  Aggravating Factors: none  Alleviating Factors: none  Treatment before arrival: Pt states that she took 325mg ASA x2 with improvement of her symptoms.    PAST MEDICAL HISTORY  Active Ambulatory Problems     Diagnosis Date Noted   • Imbalance 08/31/2016   • Cerebrovascular accident (CVA) (CMS/Formerly [facility]) 10/13/2016   • Yan's esophagus 03/05/2018   • Gastroesophageal reflux disease 03/05/2018   • Atherosclerosis of aorta (CMS/Formerly [facility]) 09/06/2018   • Abnormal EKG 09/06/2018   • Precordial pain 09/06/2018   • Dyspnea on exertion 09/06/2018     Resolved Ambulatory Problems     Diagnosis Date Noted   • No Resolved Ambulatory Problems     Past Medical History:   Diagnosis Date   • Acute pain of left shoulder    • Anxiety    • Atherosclerosis of aorta (CMS/Formerly [facility]) 9/6/2018   • Balance problem    • Yan's esophagus    • " Fatigue    • GERD (gastroesophageal reflux disease)    • Health care maintenance    • History of EKG 08/12/2015   • Hyperlipidemia    • Hypothyroidism    • Precordial pain    • Stroke (CMS/HCC)    • Vegetarian        PAST SURGICAL HISTORY  Past Surgical History:   Procedure Laterality Date   • ANTERIOR AND PELVIC EXENTERATION     • BLADDER SURGERY     • BREAST BIOPSY     • BREAST SURGERY     • COLONOSCOPY N/A 8/31/2016    Procedure: COLONOSCOPY;  Surgeon: Arsalan Lee MD;  Location: [facility] ENDOSCOPY;  Service:    • ENDOSCOPY N/A 8/31/2016    Procedure: ESOPHAGOGASTRODUODENOSCOPY WITH BIOPSIES (COLD);  Surgeon: Arsalan Lee MD;  Location: [facility] ENDOSCOPY;  Service:    • HYSTERECTOMY     • TONSILLECTOMY         FAMILY HISTORY  Family History   Problem Relation Age of Onset   • Heart failure Mother    • Stroke Sister    • Hypertension Sister    • Rheumatic fever Brother        SOCIAL HISTORY  Social History     Social History   • Marital status:      Spouse name: N/A   • Number of children: N/A   • Years of education: N/A     Occupational History   • Not on file.     Social History Main Topics   • Smoking status: Never Smoker   • Smokeless tobacco: Never Used      Comment: caffeine use   • Alcohol use No   • Drug use: No   • Sexual activity: Defer     Other Topics Concern   • Not on file     Social History Narrative   • No narrative on file       ALLERGIES  Amoxicillin; Cephalexin; Penicillins; Promethazine; and Doxycycline    REVIEW OF SYSTEMS  Review of Systems   Constitutional: Negative for fever.   HENT: Negative for sore throat.    Eyes: Negative.    Respiratory: Negative for cough and shortness of breath.    Cardiovascular: Positive for chest pain (intermittent).   Gastrointestinal: Negative for abdominal pain, diarrhea and vomiting.   Genitourinary: Negative for dysuria.   Musculoskeletal: Negative for gait problem and neck pain.   Skin: Negative for rash.   Allergic/Immunologic: Negative.   "  Neurological: Positive for weakness (LLE \"heaviness\"). Negative for numbness and headaches.        (+) Abnormal coordination of LUE   Hematological: Negative.    Psychiatric/Behavioral: Negative.    All other systems reviewed and are negative.      PHYSICAL EXAM  ED Triage Vitals [09/09/18 1224]   Temp Heart Rate Resp BP SpO2   97.8 °F (36.6 °C) 72 18 163/90 100 %      Temp src Heart Rate Source Patient Position BP Location FiO2 (%)   Oral -- -- -- --       Physical Exam   Constitutional: She is oriented to person, place, and time. No distress.   Pt appears elderly   HENT:   Head: Normocephalic and atraumatic.   Eyes: Pupils are equal, round, and reactive to light. EOM are normal.   Neck: Normal range of motion. Neck supple.   Cardiovascular: Normal rate, regular rhythm and normal heart sounds.    Pulmonary/Chest: Effort normal and breath sounds normal. No respiratory distress. She exhibits no tenderness.   Abdominal: Soft. There is no tenderness. There is no rebound and no guarding.   Musculoskeletal: Normal range of motion. She exhibits no edema.   Neurological: She is alert and oriented to person, place, and time. She has normal sensation and normal strength. She displays no weakness and facial symmetry. She has a normal Finger-Nose-Finger Test and a normal Heel to Brannon Test. She shows no pronator drift. Gait normal.   Skin: Skin is warm and dry. No rash noted.   Psychiatric: Mood and affect normal.   Nursing note and vitals reviewed.      LAB RESULTS  Lab Results (last 24 hours)     Procedure Component Value Units Date/Time    CBC & Differential [[id number]] Collected:  09/09/18 1236    Specimen:  Blood Updated:  09/09/18 1323    Narrative:       The following orders were created for panel order CBC & Differential.  Procedure                               Abnormality         Status                     ---------                               -----------         ------                     CBC Auto " Differential[[id number]]        Normal              Final result                 Please view results for these tests on the individual orders.    Comprehensive Metabolic Panel [[id number]] Collected:  09/09/18 1236    Specimen:  Blood Updated:  09/09/18 1334     Glucose 83 mg/dL      BUN 11 mg/dL      Creatinine 0.87 mg/dL      Sodium 139 mmol/L      Potassium 4.2 mmol/L      Chloride 101 mmol/L      CO2 26.1 mmol/L      Calcium 9.2 mg/dL      Total Protein 7.2 g/dL      Albumin 4.50 g/dL      ALT (SGPT) 14 U/L      AST (SGOT) 21 U/L      Alkaline Phosphatase 61 U/L      Total Bilirubin 0.9 mg/dL      eGFR Non African Amer 62 mL/min/1.73      Globulin 2.7 gm/dL      A/G Ratio 1.7 g/dL      BUN/Creatinine Ratio 12.6     Anion Gap 11.9 mmol/L     Narrative:       The MDRD GFR formula is only valid for adults with stable renal function between ages 18 and 70.    Troponin [[id number]]  (Normal) Collected:  09/09/18 1236    Specimen:  Blood Updated:  09/09/18 1333     Troponin T <0.010 ng/mL     Narrative:       Troponin T Reference Ranges:  Less than 0.03 ng/mL:    Negative for AMI  0.03 to 0.09 ng/mL:      Indeterminant for AMI  Greater than 0.09 ng/mL: Positive for AMI    CBC Auto Differential [[id number]]  (Normal) Collected:  09/09/18 1236    Specimen:  Blood Updated:  09/09/18 1323     WBC 4.98 10*3/mm3      RBC 4.62 10*6/mm3      Hemoglobin 13.9 g/dL      Hematocrit 41.9 %      MCV 90.7 fL      MCH 30.1 pg      MCHC 33.2 g/dL      RDW 13.0 %      RDW-SD 43.1 fl      MPV 10.0 fL      Platelets 277 10*3/mm3      Neutrophil % 54.7 %      Lymphocyte % 35.3 %      Monocyte % 7.0 %      Eosinophil % 2.8 %      Basophil % 0.2 %      Immature Grans % 0.2 %      Neutrophils, Absolute 2.72 10*3/mm3      Lymphocytes, Absolute 1.76 10*3/mm3      Monocytes, Absolute 0.35 10*3/mm3      Eosinophils, Absolute 0.14 10*3/mm3      Basophils, Absolute 0.01 10*3/mm3      Immature Grans, Absolute 0.01 10*3/mm3           I ordered the above  labs and reviewed the results    RADIOLOGY  CT Head Without Contrast    (Results Pending)      CT head shows nothing acute.     I ordered the above noted radiological studies. Interpreted by radiologist. Discussed with radiologist (Dr. Cox). Reviewed by me in PACS.       PROCEDURES  Procedures   1302  Interval: baseline (at presentation)  1a. Level of Consciousness: 0-->Alert, keenly responsive  1b. LOC Questions: 0-->Answers both questions correctly  1c. LOC Commands: 0-->Performs both tasks correctly  2. Best Gaze: 0-->Normal  3. Visual: 0-->No visual loss  4. Facial Palsy: 0-->Normal symmetrical movements  5a. Motor Arm, Left: 0-->No drift, limb holds 90 (or 45) degrees for full 10 secs  5b. Motor Arm, Right: 0-->No drift, limb holds 90 (or 45) degrees for full 10 secs  6a. Motor Leg, Left: 0-->No drift, leg holds 30 degree position for full 5 secs  6b. Motor Leg, Right: 0-->No drift, leg holds 30 degree position for full 5 secs  7. Limb Ataxia: 0-->Absent  8. Sensory: 0-->Normal, no sensory loss  9. Best Language: 0-->No aphasia, normal  10. Dysarthria: 0-->Normal  11. Extinction and Inattention (formerly Neglect): 0-->No abnormality    Total (NIH Stroke Scale): 0    EKG           EKG time: 1325  Rhythm/Rate: sinus bradycardia, 52  P waves and TX: normal  QRS, axis: normal    ST and T waves: non-specific ST and T wave changes     Interpreted Contemporaneously by me, independently viewed  No prior for comparison    EKG           EKG time: 1354  Rhythm/Rate: sinus bradycardia, 51  P waves and TX: normal  QRS, axis: normal   ST and T waves: non-specific ST and T wave changes     Interpreted Contemporaneously by me, independently viewed  unchanged compared to prior    PROGRESS AND CONSULTS  ED Course as of Sep 09 1441   Sun Sep 09, 2018   1242 Pt st that about 1 hour ago her left arm wasn't working while she was trying to comb her hair.  Pt states I was afraid I was having another stroke  [MS]      ED Course  User Index  [MS] Yue Demarco, APRN     1309- D/w pt and family that the pt's CP is atypical for a cardiac etiology since the discomfort is fleeting. Notified pt and family that the pt has likely had a TIA. Discussed the plan to admit the pt for further evaluation of her symptoms. Pt understands and agrees with the plan, all questions answered.    1314- Ordered blood work, troponin, EKG and CT head for further evaluation.    1337- Discussed the pt's case with Dr. Marshall (neurology), who agrees with the plan to admit the pt to medicine and that the pt is not a tPA candidate since her symptoms have resolved.    1342- Placed call to [facility] for admission.     1350- Per RN, the pt is complaining of worsening CP. Ordered repeat EKG.    1413- Rechecked pt. Pt states that she had an episode of of jaw pain that has since resolved but continues to have intermittent stabbing left sided CP. Pt states that she also feels like she is having an anxiety attack as well. Notified pt and family of the pt's repeat EKG and discussed the plan to order anxiety medication for further evaluation. Pt understands and agrees with the plan, all questions answered.    1415- Ordered Ativan and Xanax for anxiety.    1429- Rechecked pt. Pt states that she feels much improved after the Ativan and Xanax.    1450- Discussed the pt's case with Dr. Gudino ([facility]), who agrees to admit the pt to a neurotelemetry bed.    MEDICAL DECISION MAKING  Results were reviewed/discussed with the patient and they were also made aware of online access. Pt also made aware that some labs, such as cultures, will not be resulted during ER visit and follow up with PMD is necessary.     MDM  Number of Diagnoses or Management Options  Atypical chest pain:   Transient cerebral ischemia, unspecified type:      Amount and/or Complexity of Data Reviewed  Clinical lab tests: ordered and reviewed (Troponin=<0.010)  Tests in the radiology section of CPT®: ordered and  reviewed (CT head shows nothing acute)  Tests in the medicine section of CPT®: ordered and reviewed (See EKG procedure note)  Discussion of test results with the performing providers: yes (Dr. Cox)  Decide to obtain previous medical records or to obtain history from someone other than the patient: yes  Obtain history from someone other than the patient: yes (spouse)  Review and summarize past medical records: yes (Reviewed pt's office visit with [facility] on 9/6/18.)  Discuss the patient with other providers: yes (Dr. Marshall (neurology), Dr. Gudino ([facility]))  Independent visualization of images, tracings, or specimens: yes    Patient Progress  Patient progress: improved      Pt is not a tPA candidate due NIHSS=0.    DIAGNOSIS  Final diagnoses:   Transient cerebral ischemia, unspecified type   Atypical chest pain       DISPOSITION  ADMISSION    Discussed treatment plan and reason for admission with pt/family and admitting physician.  Pt/family voiced understanding of the plan for admission for further testing/treatment as needed.       Latest Documented Vital Signs:  As of 2:41 PM  BP- 163/90 HR- 77 Temp- 97.8 °F (36.6 °C) (Oral) O2 sat- 98%    --  Documentation assistance provided by naif Calderon for Dr. Luong.  Information recorded by the scribe was done at my direction and has been verified and validated by me.     Brianna Calderon  09/09/18 1114       Vance Luong MD  09/09/18 4613

## 2018-09-09 NOTE — H&P
"    Patient Name:  Becky Arceo  YOB: 1935  MRN:  [id number]  Admit Date:  9/9/2018  Patient Care Team:  Giovanni Jones MD as PCP - General (Family Medicine)  Giovanni Jones MD as PCP - Claims Attributed      Chief Complaint   Patient presents with   • Abdominal Pain     UPPER EPIGASTRIC PAIN WITH RADIATION TO HER LEFT BREAST AREA STARTING LAST NIGHT     Subjective   Ms. Arceo is a 83 y.o. female with a history of lacunar infarct and intermittent chest pain that presents to [facility] complaining of left arm weakness which she noticed at about 1130 this AM when she was brushing her hair.  She noted no numbness, HA, vision changes, or speech difficulty.  She had no left shoulder pain at the time and has intermittent neck pain, though this was not significant.  The episode lasted less than 20 minutes and resolved prior to arrival.  She has had a history of a right-sided lacunar infarct in 2014.  A NAHEED at the time showed mobile atherosclerotic plaques in the aortic arch.  She had since been on aspirin and plavix, and she took these prior to arrival.  She has had muscle aches with atorvastatin and has since refused all other statins.     She additionally has complained of a \"stabbing\" chest pain in her mid-lower sternal area and into the epigastric area which is inconsistently exertional and non-radiating.  This has been intermittent for the past several months, and she follows with Dr. Brand for this.  She was actually scheduled to have an echocardiogram this Wednesday.    History of Present Illness    Past Medical History:   Diagnosis Date   • Acute pain of left shoulder    • Anxiety    • Atherosclerosis of aorta (CMS/HCC) 9/6/2018   • Balance problem    • Yan's esophagus    • Fatigue    • GERD (gastroesophageal reflux disease)    • Health care maintenance    • History of EKG 08/12/2015   • Hyperlipidemia    • Hypothyroidism    • Precordial pain    • Stroke (CMS/HCC)    • " "Vegetarian      Past Surgical History:   Procedure Laterality Date   • ANTERIOR AND PELVIC EXENTERATION     • BLADDER SURGERY     • BREAST BIOPSY     • BREAST SURGERY     • COLONOSCOPY N/A 8/31/2016    Procedure: COLONOSCOPY;  Surgeon: Arsalan Lee MD;  Location: [facility] ENDOSCOPY;  Service:    • ENDOSCOPY N/A 8/31/2016    Procedure: ESOPHAGOGASTRODUODENOSCOPY WITH BIOPSIES (COLD);  Surgeon: Arsalan Lee MD;  Location: [facility] ENDOSCOPY;  Service:    • HYSTERECTOMY     • TONSILLECTOMY       Family History   Problem Relation Age of Onset   • Heart failure Mother    • Stroke Sister    • Hypertension Sister    • Rheumatic fever Brother      Social History   Substance Use Topics   • Smoking status: Never Smoker   • Smokeless tobacco: Never Used      Comment: caffeine use   • Alcohol use No       (Not in a hospital admission)  Allergies:    Allergies   Allergen Reactions   • Amoxicillin Itching   • Cephalexin Unknown (See Comments)     Unknown     • Penicillins Unknown (See Comments)     unknown   • Promethazine    • Doxycycline Anxiety     PT had \"a bad attack\" on this medication.  Told not to take this medication again.        Review of Systems   Constitutional: Negative for chills and fever.   HENT: Negative for congestion, nosebleeds and trouble swallowing.    Eyes: Negative for photophobia, redness and visual disturbance.   Respiratory: Positive for chest tightness. Negative for cough and shortness of breath.    Cardiovascular: Positive for chest pain. Negative for palpitations and leg swelling.   Gastrointestinal: Negative for abdominal pain, blood in stool, constipation, diarrhea, nausea and vomiting.   Endocrine: Negative for cold intolerance and heat intolerance.   Genitourinary: Negative for difficulty urinating, dysuria and frequency.   Musculoskeletal: Negative for arthralgias and myalgias.   Skin: Negative for pallor and rash.   Neurological: Positive for weakness (transient, LUE). Negative for " dizziness, light-headedness and headaches.   Hematological: Negative for adenopathy. Does not bruise/bleed easily.   Psychiatric/Behavioral: Negative for confusion and decreased concentration.        Objective    Vital Signs  Temp:  [97.8 °F (36.6 °C)] 97.8 °F (36.6 °C)  Heart Rate:  [51-77] 57  Resp:  [18] 18  BP: (128-163)/(56-92) 128/56  SpO2:  [95 %-100 %] 95 %  on   ;   Device (Oxygen Therapy): room air  Body mass index is 20.6 kg/m².    Physical Exam   Constitutional: She is oriented to person, place, and time. No distress.   HENT:   Head: Normocephalic and atraumatic.   Mouth/Throat: Oropharynx is clear and moist.   Eyes: Pupils are equal, round, and reactive to light. Conjunctivae and EOM are normal.   Neck: Normal range of motion. Neck supple.   Cardiovascular: Normal rate, regular rhythm and intact distal pulses.    Pulmonary/Chest: Effort normal and breath sounds normal.   Abdominal: Soft. Bowel sounds are normal. There is no tenderness.   Musculoskeletal: She exhibits no edema or tenderness.   Neurological: She is alert and oriented to person, place, and time. She has normal strength. No cranial nerve deficit or sensory deficit. She exhibits normal muscle tone.   Skin: Skin is warm and dry. She is not diaphoretic.   Psychiatric: She has a normal mood and affect. Her behavior is normal.   Nursing note and vitals reviewed.      Results Review:  I reviewed the patient's new clinical results.  I reviewed the patient's new imaging results and agree with the interpretation.  I reviewed the patient's other test results and agree with the interpretation  I personally viewed and interpreted the patient's EKG/Telemetry data  Discussed with ED provider.    Lab Results (last 24 hours)     Procedure Component Value Units Date/Time    CBC & Differential [[id number]] Collected:  09/09/18 1236    Specimen:  Blood Updated:  09/09/18 1323    Narrative:       The following orders were created for panel order CBC &  Differential.  Procedure                               Abnormality         Status                     ---------                               -----------         ------                     CBC Auto Differential[[id number]]        Normal              Final result                 Please view results for these tests on the individual orders.    Comprehensive Metabolic Panel [[id number]] Collected:  09/09/18 1236    Specimen:  Blood Updated:  09/09/18 1334     Glucose 83 mg/dL      BUN 11 mg/dL      Creatinine 0.87 mg/dL      Sodium 139 mmol/L      Potassium 4.2 mmol/L      Chloride 101 mmol/L      CO2 26.1 mmol/L      Calcium 9.2 mg/dL      Total Protein 7.2 g/dL      Albumin 4.50 g/dL      ALT (SGPT) 14 U/L      AST (SGOT) 21 U/L      Alkaline Phosphatase 61 U/L      Total Bilirubin 0.9 mg/dL      eGFR Non African Amer 62 mL/min/1.73      Globulin 2.7 gm/dL      A/G Ratio 1.7 g/dL      BUN/Creatinine Ratio 12.6     Anion Gap 11.9 mmol/L     Narrative:       The MDRD GFR formula is only valid for adults with stable renal function between ages 18 and 70.    Troponin [[id number]]  (Normal) Collected:  09/09/18 1236    Specimen:  Blood Updated:  09/09/18 1333     Troponin T <0.010 ng/mL     Narrative:       Troponin T Reference Ranges:  Less than 0.03 ng/mL:    Negative for AMI  0.03 to 0.09 ng/mL:      Indeterminant for AMI  Greater than 0.09 ng/mL: Positive for AMI    CBC Auto Differential [[id number]]  (Normal) Collected:  09/09/18 1236    Specimen:  Blood Updated:  09/09/18 1323     WBC 4.98 10*3/mm3      RBC 4.62 10*6/mm3      Hemoglobin 13.9 g/dL      Hematocrit 41.9 %      MCV 90.7 fL      MCH 30.1 pg      MCHC 33.2 g/dL      RDW 13.0 %      RDW-SD 43.1 fl      MPV 10.0 fL      Platelets 277 10*3/mm3      Neutrophil % 54.7 %      Lymphocyte % 35.3 %      Monocyte % 7.0 %      Eosinophil % 2.8 %      Basophil % 0.2 %      Immature Grans % 0.2 %      Neutrophils, Absolute 2.72 10*3/mm3      Lymphocytes, Absolute 1.76  10*3/mm3      Monocytes, Absolute 0.35 10*3/mm3      Eosinophils, Absolute 0.14 10*3/mm3      Basophils, Absolute 0.01 10*3/mm3      Immature Grans, Absolute 0.01 10*3/mm3           CT Head Without Contrast   Final Result      MRI Brain With & Without Contrast    (Results Pending)   MRI Angiogram Head Without Contrast    (Results Pending)   MRI Angiogram Neck With & Without Contrast    (Results Pending)     Assessment/Plan   Active Hospital Problems    Diagnosis Date Noted   • **Transient cerebral ischemia [G45.9] 09/09/2018   • History of lacunar cerebrovascular accident [Z86.73] 09/09/2018   • Left arm weakness [R29.898] 09/09/2018   • Precordial pain [R07.2] 09/06/2018   • Gastroesophageal reflux disease [K21.9] 03/05/2018      Resolved Hospital Problems    Diagnosis Date Noted Date Resolved   No resolved problems to display.   Left Arm Weakness  - will work up for TIA given her history  - will check MRI/MRA of the head and neck along with echo/bubble study  - nursing swallow assessment, NPO until then  - continue ASA/Plavix-patient has had intolerance to statins-will offer these at a reduced dose but she has refused these in the past  - will consult neurology    Chest Pain  - atypical, has this chronically  - will follow troponins-initial is negative and ECG is unchanged  - echo as above  - continue on ranexa  - if any changes or significant findings will ask cardiology to see but otherwise she can follow up with Dr. Brand as an outpatient as this had been a chronic issue    GERD  - could be contributing to chest pain  - continue antacids  - complicated by Yan's Esophagus-can follow up with Dr. Lee as scheduled    DVT Prophylaxis  - SCDs    Code Status  - I discussed with the patient and she is full code.  Healthcare surrogate is her .    I discussed the patients findings and my recommendations with patient, family, nursing staff and consulting provider.      Brian Gudino MD  [facility]  09/09/18  3:56 PM

## 2018-09-09 NOTE — PLAN OF CARE
Problem: Activity/Energy/Vigor Impairment (Anxiety Signs/Symptoms) (Adult)  Goal: Improved Energy/Vigor (Anxiety Signs/Symptoms)  Outcome: Outcome(s) achieved Date Met: 09/09/18 09/09/18 1805   Improved Energy/Vigor (Anxiety Signs/Symptoms)   Improved Energy/Vigor Action Step (STG) Outcome achieves outcome

## 2018-09-09 NOTE — PLAN OF CARE
Problem: Stroke (Ischemic) (Adult)  Goal: Signs and Symptoms of Listed Potential Problems Will be Absent, Minimized or Managed (Stroke)  Outcome: Ongoing (interventions implemented as appropriate)   09/09/18 1806   Goal/Outcome Evaluation   Problems Assessed (Stroke (Ischemic)) all   Problems Assessed (Stroke (Ischemic)) none       Problem: Anxiety (Adult)  Goal: Identify Related Risk Factors and Signs and Symptoms  Outcome: Ongoing (interventions implemented as appropriate)   09/09/18 1806   Anxiety (Adult)   Related Risk Factors (Anxiety) coping ineffective;psychosocial factor   Signs and Symptoms (Anxiety) nervousness/tension/restlessness;other (see comments)  (Dyspnea on exertion)     Goal: Reduction/Resolution  Outcome: Ongoing (interventions implemented as appropriate)   09/09/18 1806   Anxiety (Adult)   Reduction/Resolution making progress toward outcome

## 2018-09-10 ENCOUNTER — APPOINTMENT (OUTPATIENT)
Dept: MRI IMAGING | Facility: HOSPITAL | Age: 83
End: 2018-09-10
Attending: INTERNAL MEDICINE

## 2018-09-10 ENCOUNTER — APPOINTMENT (OUTPATIENT)
Dept: CARDIOLOGY | Facility: HOSPITAL | Age: 83
End: 2018-09-10
Attending: PSYCHIATRY & NEUROLOGY

## 2018-09-10 ENCOUNTER — APPOINTMENT (OUTPATIENT)
Dept: CARDIOLOGY | Facility: HOSPITAL | Age: 83
End: 2018-09-10
Attending: INTERNAL MEDICINE

## 2018-09-10 VITALS
HEART RATE: 57 BPM | WEIGHT: 121.25 LBS | DIASTOLIC BLOOD PRESSURE: 66 MMHG | HEIGHT: 64 IN | OXYGEN SATURATION: 95 % | TEMPERATURE: 97.3 F | SYSTOLIC BLOOD PRESSURE: 134 MMHG | BODY MASS INDEX: 20.7 KG/M2 | RESPIRATION RATE: 18 BRPM

## 2018-09-10 LAB
BH CV ECHO MEAS - ACS: 1.6 CM
BH CV ECHO MEAS - AI DEC SLOPE: 87.9 CM/SEC^2
BH CV ECHO MEAS - AI MAX PG: 40.2 MMHG
BH CV ECHO MEAS - AI MAX VEL: 317 CM/SEC
BH CV ECHO MEAS - AI P1/2T: 1056 MSEC
BH CV ECHO MEAS - AO MAX PG (FULL): 2.1 MMHG
BH CV ECHO MEAS - AO MAX PG: 5.4 MMHG
BH CV ECHO MEAS - AO MEAN PG (FULL): 1 MMHG
BH CV ECHO MEAS - AO MEAN PG: 3 MMHG
BH CV ECHO MEAS - AO ROOT AREA (BSA CORRECTED): 1.8
BH CV ECHO MEAS - AO ROOT AREA: 6.2 CM^2
BH CV ECHO MEAS - AO ROOT DIAM: 2.8 CM
BH CV ECHO MEAS - AO V2 MAX: 116 CM/SEC
BH CV ECHO MEAS - AO V2 MEAN: 76.4 CM/SEC
BH CV ECHO MEAS - AO V2 VTI: 33 CM
BH CV ECHO MEAS - AVA(I,A): 1.9 CM^2
BH CV ECHO MEAS - AVA(I,D): 1.9 CM^2
BH CV ECHO MEAS - AVA(V,A): 2 CM^2
BH CV ECHO MEAS - AVA(V,D): 2 CM^2
BH CV ECHO MEAS - BSA(HAYCOCK): 1.6 M^2
BH CV ECHO MEAS - BSA: 1.6 M^2
BH CV ECHO MEAS - BZI_BMI: 20.8 KILOGRAMS/M^2
BH CV ECHO MEAS - BZI_METRIC_HEIGHT: 162.6 CM
BH CV ECHO MEAS - BZI_METRIC_WEIGHT: 54.9 KG
BH CV ECHO MEAS - EDV(CUBED): 64 ML
BH CV ECHO MEAS - EDV(MOD-SP2): 51 ML
BH CV ECHO MEAS - EDV(MOD-SP4): 46 ML
BH CV ECHO MEAS - EDV(TEICH): 70 ML
BH CV ECHO MEAS - EF(CUBED): 61.9 %
BH CV ECHO MEAS - EF(MOD-BP): 53 %
BH CV ECHO MEAS - EF(MOD-SP2): 51 %
BH CV ECHO MEAS - EF(MOD-SP4): 58.7 %
BH CV ECHO MEAS - EF(TEICH): 54 %
BH CV ECHO MEAS - ESV(CUBED): 24.4 ML
BH CV ECHO MEAS - ESV(MOD-SP2): 25 ML
BH CV ECHO MEAS - ESV(MOD-SP4): 19 ML
BH CV ECHO MEAS - ESV(TEICH): 32.2 ML
BH CV ECHO MEAS - FS: 27.5 %
BH CV ECHO MEAS - IVS/LVPW: 1
BH CV ECHO MEAS - IVSD: 0.9 CM
BH CV ECHO MEAS - LAT PEAK E' VEL: 5 CM/SEC
BH CV ECHO MEAS - LV DIASTOLIC VOL/BSA (35-75): 29.1 ML/M^2
BH CV ECHO MEAS - LV MASS(C)D: 109.7 GRAMS
BH CV ECHO MEAS - LV MASS(C)DI: 69.4 GRAMS/M^2
BH CV ECHO MEAS - LV MAX PG: 3.2 MMHG
BH CV ECHO MEAS - LV MEAN PG: 2 MMHG
BH CV ECHO MEAS - LV SYSTOLIC VOL/BSA (12-30): 12 ML/M^2
BH CV ECHO MEAS - LV V1 MAX: 90.1 CM/SEC
BH CV ECHO MEAS - LV V1 MEAN: 59.1 CM/SEC
BH CV ECHO MEAS - LV V1 VTI: 25.1 CM
BH CV ECHO MEAS - LVIDD: 4 CM
BH CV ECHO MEAS - LVIDS: 2.9 CM
BH CV ECHO MEAS - LVLD AP2: 6.9 CM
BH CV ECHO MEAS - LVLD AP4: 6.7 CM
BH CV ECHO MEAS - LVLS AP2: 6.6 CM
BH CV ECHO MEAS - LVLS AP4: 6.1 CM
BH CV ECHO MEAS - LVOT AREA (M): 2.5 CM^2
BH CV ECHO MEAS - LVOT AREA: 2.5 CM^2
BH CV ECHO MEAS - LVOT DIAM: 1.8 CM
BH CV ECHO MEAS - LVPWD: 0.9 CM
BH CV ECHO MEAS - MED PEAK E' VEL: 5 CM/SEC
BH CV ECHO MEAS - MV A DUR: 0.1 SEC
BH CV ECHO MEAS - MV A MAX VEL: 99 CM/SEC
BH CV ECHO MEAS - MV DEC SLOPE: 294 CM/SEC^2
BH CV ECHO MEAS - MV DEC TIME: 0.17 SEC
BH CV ECHO MEAS - MV E MAX VEL: 70.3 CM/SEC
BH CV ECHO MEAS - MV E/A: 0.71
BH CV ECHO MEAS - MV MAX PG: 3.1 MMHG
BH CV ECHO MEAS - MV MEAN PG: 1 MMHG
BH CV ECHO MEAS - MV P1/2T MAX VEL: 65.2 CM/SEC
BH CV ECHO MEAS - MV P1/2T: 65 MSEC
BH CV ECHO MEAS - MV V2 MAX: 87.5 CM/SEC
BH CV ECHO MEAS - MV V2 MEAN: 41.1 CM/SEC
BH CV ECHO MEAS - MV V2 VTI: 35.1 CM
BH CV ECHO MEAS - MVA P1/2T LCG: 3.4 CM^2
BH CV ECHO MEAS - MVA(P1/2T): 3.4 CM^2
BH CV ECHO MEAS - MVA(VTI): 1.8 CM^2
BH CV ECHO MEAS - PA ACC TIME: 0.19 SEC
BH CV ECHO MEAS - PA MAX PG (FULL): 0.24 MMHG
BH CV ECHO MEAS - PA MAX PG: 1.1 MMHG
BH CV ECHO MEAS - PA PR(ACCEL): -6.5 MMHG
BH CV ECHO MEAS - PA V2 MAX: 53.2 CM/SEC
BH CV ECHO MEAS - PULM A REVS DUR: 0.14 SEC
BH CV ECHO MEAS - PULM A REVS VEL: 24.2 CM/SEC
BH CV ECHO MEAS - PULM DIAS VEL: 27.6 CM/SEC
BH CV ECHO MEAS - PULM S/D: 1.2
BH CV ECHO MEAS - PULM SYS VEL: 31.8 CM/SEC
BH CV ECHO MEAS - PVA(V,A): 2.5 CM^2
BH CV ECHO MEAS - PVA(V,D): 2.5 CM^2
BH CV ECHO MEAS - QP/QS: 0.57
BH CV ECHO MEAS - RAP SYSTOLE: 3 MMHG
BH CV ECHO MEAS - RV MAX PG: 0.89 MMHG
BH CV ECHO MEAS - RV MEAN PG: 1 MMHG
BH CV ECHO MEAS - RV V1 MAX: 47.3 CM/SEC
BH CV ECHO MEAS - RV V1 MEAN: 34.4 CM/SEC
BH CV ECHO MEAS - RV V1 VTI: 12.8 CM
BH CV ECHO MEAS - RVOT AREA: 2.8 CM^2
BH CV ECHO MEAS - RVOT DIAM: 1.9 CM
BH CV ECHO MEAS - RVSP: 17 MMHG
BH CV ECHO MEAS - SI(AO): 128.6 ML/M^2
BH CV ECHO MEAS - SI(CUBED): 25.1 ML/M^2
BH CV ECHO MEAS - SI(LVOT): 40.4 ML/M^2
BH CV ECHO MEAS - SI(MOD-SP2): 16.5 ML/M^2
BH CV ECHO MEAS - SI(MOD-SP4): 17.1 ML/M^2
BH CV ECHO MEAS - SI(TEICH): 23.9 ML/M^2
BH CV ECHO MEAS - SV(AO): 203.2 ML
BH CV ECHO MEAS - SV(CUBED): 39.6 ML
BH CV ECHO MEAS - SV(LVOT): 63.9 ML
BH CV ECHO MEAS - SV(MOD-SP2): 26 ML
BH CV ECHO MEAS - SV(MOD-SP4): 27 ML
BH CV ECHO MEAS - SV(RVOT): 36.3 ML
BH CV ECHO MEAS - SV(TEICH): 37.8 ML
BH CV ECHO MEAS - TAPSE (>1.6): 1.8 CM2
BH CV ECHO MEAS - TR MAX VEL: 187 CM/SEC
BH CV ECHO MEASUREMENTS AVERAGE E/E' RATIO: 14.06
BH CV VAS BP RIGHT ARM: NORMAL MMHG
BH CV XLRA - TDI S': 8 CM/SEC
BILIRUB UR QL STRIP: NEGATIVE
CHOLEST SERPL-MCNC: 222 MG/DL (ref 0–200)
CLARITY UR: CLEAR
COLOR UR: YELLOW
GLUCOSE UR STRIP-MCNC: NEGATIVE MG/DL
HBA1C MFR BLD: 5 % (ref 4.8–5.6)
HDLC SERPL-MCNC: 62 MG/DL (ref 40–60)
HGB UR QL STRIP.AUTO: NEGATIVE
KETONES UR QL STRIP: NEGATIVE
LDLC SERPL CALC-MCNC: 145 MG/DL (ref 0–100)
LDLC/HDLC SERPL: 2.33 {RATIO}
LEFT ATRIUM VOLUME INDEX: 29 ML/M2
LEUKOCYTE ESTERASE UR QL STRIP.AUTO: NEGATIVE
MAXIMAL PREDICTED HEART RATE: 138 BPM
NITRITE UR QL STRIP: NEGATIVE
PH UR STRIP.AUTO: 6 [PH] (ref 5–8)
PROT UR QL STRIP: NEGATIVE
SP GR UR STRIP: 1.01 (ref 1–1.03)
STRESS TARGET HR: 117 BPM
TRIGL SERPL-MCNC: 77 MG/DL (ref 0–150)
TROPONIN T SERPL-MCNC: <0.01 NG/ML (ref 0–0.03)
TSH SERPL DL<=0.05 MIU/L-ACNC: 3.95 MIU/ML (ref 0.27–4.2)
UROBILINOGEN UR QL STRIP: NORMAL
VIT B12 BLD-MCNC: 920 PG/ML (ref 211–946)
VLDLC SERPL-MCNC: 15.4 MG/DL (ref 5–40)

## 2018-09-10 PROCEDURE — G8989 SELF CARE D/C STATUS: HCPCS | Performed by: OCCUPATIONAL THERAPIST

## 2018-09-10 PROCEDURE — 70549 MR ANGIOGRAPH NECK W/O&W/DYE: CPT

## 2018-09-10 PROCEDURE — 93306 TTE W/DOPPLER COMPLETE: CPT

## 2018-09-10 PROCEDURE — G8988 SELF CARE GOAL STATUS: HCPCS | Performed by: OCCUPATIONAL THERAPIST

## 2018-09-10 PROCEDURE — 70553 MRI BRAIN STEM W/O & W/DYE: CPT

## 2018-09-10 PROCEDURE — G8998 SWALLOW D/C STATUS: HCPCS

## 2018-09-10 PROCEDURE — G8987 SELF CARE CURRENT STATUS: HCPCS | Performed by: OCCUPATIONAL THERAPIST

## 2018-09-10 PROCEDURE — 83036 HEMOGLOBIN GLYCOSYLATED A1C: CPT | Performed by: INTERNAL MEDICINE

## 2018-09-10 PROCEDURE — G8980 MOBILITY D/C STATUS: HCPCS

## 2018-09-10 PROCEDURE — 93306 TTE W/DOPPLER COMPLETE: CPT | Performed by: INTERNAL MEDICINE

## 2018-09-10 PROCEDURE — 81003 URINALYSIS AUTO W/O SCOPE: CPT | Performed by: PSYCHIATRY & NEUROLOGY

## 2018-09-10 PROCEDURE — 99204 OFFICE O/P NEW MOD 45 MIN: CPT | Performed by: PSYCHIATRY & NEUROLOGY

## 2018-09-10 PROCEDURE — 0296T HC EXT ECG > 48HR TO 21 DAY RCRD W/CONECT INTL RCRD: CPT

## 2018-09-10 PROCEDURE — G0378 HOSPITAL OBSERVATION PER HR: HCPCS

## 2018-09-10 PROCEDURE — 70544 MR ANGIOGRAPHY HEAD W/O DYE: CPT

## 2018-09-10 PROCEDURE — G8978 MOBILITY CURRENT STATUS: HCPCS

## 2018-09-10 PROCEDURE — 0 GADOBENATE DIMEGLUMINE 529 MG/ML SOLUTION: Performed by: HOSPITALIST

## 2018-09-10 PROCEDURE — 82607 VITAMIN B-12: CPT | Performed by: INTERNAL MEDICINE

## 2018-09-10 PROCEDURE — 92610 EVALUATE SWALLOWING FUNCTION: CPT

## 2018-09-10 PROCEDURE — G8996 SWALLOW CURRENT STATUS: HCPCS

## 2018-09-10 PROCEDURE — 97161 PT EVAL LOW COMPLEX 20 MIN: CPT

## 2018-09-10 PROCEDURE — G8997 SWALLOW GOAL STATUS: HCPCS

## 2018-09-10 PROCEDURE — 80061 LIPID PANEL: CPT | Performed by: INTERNAL MEDICINE

## 2018-09-10 PROCEDURE — A9577 INJ MULTIHANCE: HCPCS | Performed by: HOSPITALIST

## 2018-09-10 PROCEDURE — G8979 MOBILITY GOAL STATUS: HCPCS

## 2018-09-10 PROCEDURE — 96361 HYDRATE IV INFUSION ADD-ON: CPT

## 2018-09-10 RX ORDER — ROSUVASTATIN CALCIUM 10 MG/1
10 TABLET, COATED ORAL NIGHTLY
Status: DISCONTINUED | OUTPATIENT
Start: 2018-09-10 | End: 2018-09-10 | Stop reason: HOSPADM

## 2018-09-10 RX ORDER — ROSUVASTATIN CALCIUM 10 MG/1
5 TABLET, COATED ORAL NIGHTLY
Qty: 30 TABLET | Refills: 0 | Status: SHIPPED | OUTPATIENT
Start: 2018-09-10 | End: 2020-06-12

## 2018-09-10 RX ADMIN — GADOBENATE DIMEGLUMINE 11 ML: 529 INJECTION, SOLUTION INTRAVENOUS at 11:46

## 2018-09-10 RX ADMIN — SODIUM CHLORIDE 75 ML/HR: 9 INJECTION, SOLUTION INTRAVENOUS at 10:11

## 2018-09-10 RX ADMIN — ASPIRIN 325 MG: 325 TABLET ORAL at 10:09

## 2018-09-10 RX ADMIN — RANOLAZINE 500 MG: 500 TABLET, FILM COATED, EXTENDED RELEASE ORAL at 10:09

## 2018-09-10 RX ADMIN — CLOPIDOGREL 75 MG: 75 TABLET, FILM COATED ORAL at 10:09

## 2018-09-10 RX ADMIN — FAMOTIDINE 20 MG: 20 TABLET, FILM COATED ORAL at 10:09

## 2018-09-10 NOTE — PROGRESS NOTES
Continued Stay Note  [facility]     Patient Name: Becky Arceo  MRN: [id number]  Today's Date: 9/10/2018    Admit Date: 9/9/2018          Discharge Plan     Row Name 09/10/18 1322       Plan    Plan Plans are home.  CCP will cont to follow.               Discharge Codes    No documentation.           Anna Gonzalez RN

## 2018-09-10 NOTE — PLAN OF CARE
Problem: Patient Care Overview  Goal: Plan of Care Review  Outcome: Ongoing (interventions implemented as appropriate)   09/10/18 1122   Coping/Psychosocial   Plan of Care Reviewed With patient   OTHER   Outcome Summary Bedside swallow eval completed. Swallow appears to be within functional limits. No overt s/s aspiration with thin, puree, mech soft, and regular. Pt with cough/throat clear prior to PO trials, pt reports phlegm. Recommend regulat textures and thin liquids, meds whole with thin. ST to s/o at this time. Please re-consult as warranted.

## 2018-09-10 NOTE — PLAN OF CARE
Problem: Patient Care Overview  Goal: Plan of Care Review   09/10/18 5611   Coping/Psychosocial   Plan of Care Reviewed With patient   OTHER   Outcome Summary Pt presents to OT eval following TIA. Pt at supervision level with ADLs, moves very quickly but no LOB with toileting. No further OT needs, OT will d/c.

## 2018-09-10 NOTE — CONSULTS
Encounter Date: 9/10/2018    CHIEF COMPLAINT: Left arm weakness.  This lasted 20 minutes.    Patient Active Problem List   Diagnosis   • Imbalance   • Cerebrovascular accident (CVA) (CMS/HCC)   • Yan's esophagus   • Gastroesophageal reflux disease   • Atherosclerosis of aorta (CMS/HCC)   • Abnormal EKG   • Precordial pain   • Dyspnea on exertion   • Transient cerebral ischemia   • History of lacunar cerebrovascular accident   • Left arm weakness       PRESENT ILLNESS:    Portions of this notes is copied from previous physician encounters, reviewed and edited appropriately.    Background:     Becky Arceo is a 83 y.o. female past medical history of CVA [June 2014 had a right basal ganglia stroke affecting her left side], heart failure, hypertension now admitted with 20 minutes of left arm weakness with no associated leg involvement of speech or vision issues.  By the time patient came to the emergency room symptoms had resolved.  Patient is dual antiplatelet therapy since 2014 after her first CVA.  Patient has intolerance to statins.    Review of systems   No neck stiffness  No photophobia  All systems reviewed and are negative.         Past Medical History:   Diagnosis Date   • Acute pain of left shoulder    • Anxiety    • Atherosclerosis of aorta (CMS/HCC) 9/6/2018   • Balance problem    • Yan's esophagus    • Fatigue    • GERD (gastroesophageal reflux disease)    • Health care maintenance    • History of EKG 08/12/2015   • Hyperlipidemia    • Hypothyroidism    • Precordial pain    • Stroke (CMS/HCC)    • Vegetarian        Past Surgical History:   Procedure Laterality Date   • ANTERIOR AND PELVIC EXENTERATION     • BLADDER SURGERY     • BREAST BIOPSY     • BREAST SURGERY     • COLONOSCOPY N/A 8/31/2016    Procedure: COLONOSCOPY;  Surgeon: Arsalan Lee MD;  Location: [facility] ENDOSCOPY;  Service:    • ENDOSCOPY N/A 8/31/2016    Procedure: ESOPHAGOGASTRODUODENOSCOPY WITH BIOPSIES (COLD);  Surgeon: Arsalan  NIDA Lee MD;  Location: [facility] ENDOSCOPY;  Service:    • HYSTERECTOMY     • TONSILLECTOMY         Current Facility-Administered Medications   Medication Dose Route Frequency Provider Last Rate Last Dose   • acetaminophen (TYLENOL) tablet 650 mg  650 mg Oral Q4H PRN Brian Gudino MD        Or   • acetaminophen (TYLENOL) suppository 650 mg  650 mg Rectal Q4H PRN Brian Gudino MD       • aspirin tablet 325 mg  325 mg Oral Daily Brian Gudino MD   325 mg at 09/09/18 2010    Or   • aspirin suppository 300 mg  300 mg Rectal Daily Brian Gudino MD       • atorvastatin (LIPITOR) tablet 40 mg  40 mg Oral Nightly Brian Gudino MD   40 mg at 09/09/18 2009   • bisacodyl (DULCOLAX) EC tablet 5 mg  5 mg Oral Daily PRN Brian Gudino MD       • calcium carbonate (TUMS) chewable tablet 500 mg (200 mg elemental)  2 tablet Oral BID PRN Brian Gudino MD       • clopidogrel (PLAVIX) tablet 75 mg  75 mg Oral Daily Brian Gudino MD   75 mg at 09/09/18 2010   • famotidine (PEPCID) tablet 20 mg  20 mg Oral Daily Brian Gudino MD   20 mg at 09/09/18 2009   • LORazepam (ATIVAN) tablet 1 mg  1 mg Oral Daily PRN Brian Gudino MD       • nitroglycerin (NITROSTAT) SL tablet 0.4 mg  0.4 mg Sublingual Q5 Min PRN Brian Gudino MD       • ondansetron (ZOFRAN) tablet 4 mg  4 mg Oral Q6H PRN Brian Gudino MD        Or   • ondansetron ODT (ZOFRAN-ODT) disintegrating tablet 4 mg  4 mg Oral Q6H PRN Brian Gudino MD        Or   • ondansetron (ZOFRAN) injection 4 mg  4 mg Intravenous Q6H PRN Brian Gudino MD       • ranolazine (RANEXA) 12 hr tablet 500 mg  500 mg Oral Q12H Brian Gudino MD   500 mg at 09/09/18 2009   • simethicone (MYLICON,GAS-X) capsule 180 mg  180 mg Oral 4x Daily PRN Brian Gudino MD       • sodium chloride 0.9 % flush 1-10 mL  1-10 mL Intravenous PRN Brian Gudino MD       • sodium chloride 0.9 % infusion  75 mL/hr Intravenous Continuous Brian Gudino  "MD MARTA 75 mL/hr at 09/10/18 0139 75 mL/hr at 09/10/18 0139       Allergies   Allergen Reactions   • Amoxicillin Itching   • Cephalexin Unknown (See Comments)     Unknown     • Penicillins Unknown (See Comments)     unknown   • Promethazine    • Doxycycline Anxiety     PT had \"a bad attack\" on this medication.  Told not to take this medication again.        Social History     Social History   • Marital status:      Spouse name: N/A   • Number of children: N/A   • Years of education: N/A     Occupational History   • Not on file.     Social History Main Topics   • Smoking status: Never Smoker   • Smokeless tobacco: Never Used      Comment: caffeine use   • Alcohol use No   • Drug use: No   • Sexual activity: Defer     Other Topics Concern   • Not on file     Social History Narrative   • No narrative on file       Family History   Problem Relation Age of Onset   • Heart failure Mother    • Stroke Sister    • Hypertension Sister    • Rheumatic fever Brother        Family Status   Relation Status   • Mother    • Sister (Not Specified)   • Brother (Not Specified)   • Father        No current facility-administered medications on file prior to encounter.      Current Outpatient Prescriptions on File Prior to Encounter   Medication Sig   • aspirin 81 MG tablet Take 1 tablet by mouth Daily.   • clopidogrel (PLAVIX) 75 MG tablet Take 1 tablet by mouth Daily.   • Coenzyme Q10 (COQ10 PO) Take  by mouth.   • Garlic 350 MG tablet Take  by mouth.   • Kelp 100 MG tablet Take  by mouth.   • LORazepam (ATIVAN) 1 MG tablet Take 1 tablet by mouth Daily As Needed. For anxiety   • Magnesium 100 MG capsule Take 1 capsule by mouth Daily.   • POTASSIUM PO Take  by mouth.   • Probiotic Product (PROBIOTIC DAILY) capsule Take  by mouth.   • raNITIdine (ZANTAC) 150 MG tablet Take 150 mg by mouth.   • ranolazine (RANEXA) 500 MG 12 hr tablet Take 1 tablet by mouth Every 12 (Twelve) Hours.   • Red Yeast Rice Extract (RED YEAST " RICE PO) Take  by mouth.   • [employer] GAS RELIEF ULTRA STRENGTH 180 MG capsule Take 180 mg by mouth As Needed.           PHYSICAL EXAMINATION:    Vitals: Patient Vitals for the past 4 hrs:   Weight   09/10/18 0500 55 kg (121 lb 4.1 oz)     Temp:  [97.4 °F (36.3 °C)-98 °F (36.7 °C)] 98 °F (36.7 °C)  Heart Rate:  [51-77] 51  Resp:  [18] 18  BP: (108-163)/(56-95) 108/57    General:  pleasant in no acute distress.  HEENT: No pallor or icterus. Neck supple. No Carotid bruits.  Heart: S1 and S2 normal with regular rhythm.  No murmur.       GENERAL NEUROLOGIC EXAM     Mental Status: Awake alert and oriented to person place and time. Language is normal for comprehension, repetition, naming and fluency. Recent and remote memory were normal.  Attention span and concentration were normal. Fund of knowledge was normal.      Cranial nerves:  Fundi were normal bilaterally.  Visual fields were full to confrontation.  Pupils are equal regular and reactive to light. Extraocular movements are intact. Facial sensation was normal and symmetrical bilaterally. Muscles of facial expression were strong and symmetric. Hearing to finger rub normal bilaterally. Palate elevates symmetrically. Sternocleidomastoid and trapezius normal. The tongue protrudes midline without atrophy or fasciculations.      Motor: Normal tone and bulk with no involuntary movements or pronator drift.    Strength normal 5/5 in bilateral upper and lower extremities.     Sensation: Light touch, pin prick, vibration and joint position sense were normal in the upper and lower extremities.     Reflexes: 2+ bilaterally symmetrical with down going toes.    Coordination: finger nose and heel shin test normal bilaterally.     Gait: Normal station and gait.  Heel, toe and tandem walk normal.  Romberg negative.     Labs:     Lab Results   Component Value Date    HGB 13.9 09/09/2018    HCT 41.9 09/09/2018    WBC 4.98 09/09/2018     09/09/2018     Lab Results   Component Value  Date    BUN 11 09/09/2018    CALCIUM 9.2 09/09/2018     09/09/2018    K 4.2 09/09/2018     09/09/2018    CO2 26.1 09/09/2018     Lab Results   Component Value Date    ALT 14 09/09/2018    AST 21 09/09/2018    BILITOT 0.9 09/09/2018    BILIDIR <0.20 09/25/2014     No results found for: PHOS, MG, PROTIME, INR, APTT  No components found for: POCGLUC  No components found for: A1C  Lab Results   Component Value Date    HDL 62 (H) 09/10/2018     (H) 09/10/2018     No components found for: B12  Lab Results   Component Value Date    TSH 3.950 09/09/2018       Lab Results (last 24 hours)     Procedure Component Value Units Date/Time    Lipid Panel [[id number]]  (Abnormal) Collected:  09/10/18 0625    Specimen:  Blood Updated:  09/10/18 0717     Total Cholesterol 222 (H) mg/dL      Triglycerides 77 mg/dL      HDL Cholesterol 62 (H) mg/dL      LDL Cholesterol  145 (H) mg/dL      VLDL Cholesterol 15.4 mg/dL      LDL/HDL Ratio 2.33    Narrative:       Cholesterol Reference Ranges  (U.S. Department of Health and Human Services ATP III Classifications)    Desirable          <200 mg/dL  Borderline High    200-239 mg/dL  High Risk          >240 mg/dL      Triglyceride Reference Ranges  (U.S. Department of Health and Human Services ATP III Classifications)    Normal           <150 mg/dL  Borderline High  150-199 mg/dL  High             200-499 mg/dL  Very High        >500 mg/dL    HDL Reference Ranges  (U.S. Department of Health and Human Services ATP III Classifcations)    Low     <40 mg/dl (major risk factor for CHD)  High    >60 mg/dl ('negative' risk factor for CHD)        LDL Reference Ranges  (U.S. Department of Health and Human Services ATP III Classifcations)    Optimal          <100 mg/dL  Near Optimal     100-129 mg/dL  Borderline High  130-159 mg/dL  High             160-189 mg/dL  Very High        >189 mg/dL    Hemoglobin A1c [[id number]]  (Normal) Collected:  09/10/18 0625    Specimen:  Blood Updated:   09/10/18 0700     Hemoglobin A1C 5.00 %     Narrative:       Hemoglobin A1C Ranges:    Increased Risk for Diabetes  5.7% to 6.4%  Diabetes                     >= 6.5%  Diabetic Goal                < 7.0%    Vitamin B12 [[id number]] Collected:  09/10/18 0625    Specimen:  Blood Updated:  09/10/18 0643    Troponin [[id number]]  (Normal) Collected:  09/09/18 2358    Specimen:  Blood Updated:  09/10/18 0054     Troponin T <0.010 ng/mL     Narrative:       Troponin T Reference Ranges:  Less than 0.03 ng/mL:    Negative for AMI  0.03 to 0.09 ng/mL:      Indeterminant for AMI  Greater than 0.09 ng/mL: Positive for AMI    TSH [[id number]]  (Normal) Collected:  09/09/18 2358    Specimen:  Blood Updated:  09/10/18 0054     TSH 3.950 mIU/mL     Troponin [[id number]]  (Normal) Collected:  09/09/18 1805    Specimen:  Blood Updated:  09/09/18 1847     Troponin T <0.010 ng/mL     Narrative:       Troponin T Reference Ranges:  Less than 0.03 ng/mL:    Negative for AMI  0.03 to 0.09 ng/mL:      Indeterminant for AMI  Greater than 0.09 ng/mL: Positive for AMI    [place] Draw [[id number]] Collected:  09/09/18 1236    Specimen:  Blood Updated:  09/09/18 1345    Narrative:       The following orders were created for panel order [place] Draw.  Procedure                               Abnormality         Status                     ---------                               -----------         ------                     Light Blue Top[[id number]]                                   Final result               Green Top (Gel)[[id number]]                                  Final result               Lavender Top[[id number]]                                     Final result               Gold Top - SST[[id number]]                                   Final result                 Please view results for these tests on the individual orders.    Light Blue Top [[id number]] Collected:  09/09/18 1236    Specimen:  Blood Updated:  09/09/18 1345     Extra Tube hold for  add-on     Comment: Auto resulted       Green Top (Gel) [[id number]] Collected:  09/09/18 1236    Specimen:  Blood Updated:  09/09/18 1345     Extra Tube Hold for add-ons.     Comment: Auto resulted.       Lavender Top [[id number]] Collected:  09/09/18 1236    Specimen:  Blood Updated:  09/09/18 1345     Extra Tube hold for add-on     Comment: Auto resulted       Gold Top - SST [[id number]] Collected:  09/09/18 1236    Specimen:  Blood Updated:  09/09/18 1345     Extra Tube Hold for add-ons.     Comment: Auto resulted.       Comprehensive Metabolic Panel [[id number]] Collected:  09/09/18 1236    Specimen:  Blood Updated:  09/09/18 1334     Glucose 83 mg/dL      BUN 11 mg/dL      Creatinine 0.87 mg/dL      Sodium 139 mmol/L      Potassium 4.2 mmol/L      Chloride 101 mmol/L      CO2 26.1 mmol/L      Calcium 9.2 mg/dL      Total Protein 7.2 g/dL      Albumin 4.50 g/dL      ALT (SGPT) 14 U/L      AST (SGOT) 21 U/L      Alkaline Phosphatase 61 U/L      Total Bilirubin 0.9 mg/dL      eGFR Non African Amer 62 mL/min/1.73      Globulin 2.7 gm/dL      A/G Ratio 1.7 g/dL      BUN/Creatinine Ratio 12.6     Anion Gap 11.9 mmol/L     Narrative:       The MDRD GFR formula is only valid for adults with stable renal function between ages 18 and 70.    Troponin [[id number]]  (Normal) Collected:  09/09/18 1236    Specimen:  Blood Updated:  09/09/18 1333     Troponin T <0.010 ng/mL     Narrative:       Troponin T Reference Ranges:  Less than 0.03 ng/mL:    Negative for AMI  0.03 to 0.09 ng/mL:      Indeterminant for AMI  Greater than 0.09 ng/mL: Positive for AMI    CBC & Differential [[id number]] Collected:  09/09/18 1236    Specimen:  Blood Updated:  09/09/18 1323    Narrative:       The following orders were created for panel order CBC & Differential.  Procedure                               Abnormality         Status                     ---------                               -----------         ------                     CBC Auto  Differential[[id number]]        Normal              Final result                 Please view results for these tests on the individual orders.    CBC Auto Differential [[id number]]  (Normal) Collected:  09/09/18 1236    Specimen:  Blood Updated:  09/09/18 1323     WBC 4.98 10*3/mm3      RBC 4.62 10*6/mm3      Hemoglobin 13.9 g/dL      Hematocrit 41.9 %      MCV 90.7 fL      MCH 30.1 pg      MCHC 33.2 g/dL      RDW 13.0 %      RDW-SD 43.1 fl      MPV 10.0 fL      Platelets 277 10*3/mm3      Neutrophil % 54.7 %      Lymphocyte % 35.3 %      Monocyte % 7.0 %      Eosinophil % 2.8 %      Basophil % 0.2 %      Immature Grans % 0.2 %      Neutrophils, Absolute 2.72 10*3/mm3      Lymphocytes, Absolute 1.76 10*3/mm3      Monocytes, Absolute 0.35 10*3/mm3      Eosinophils, Absolute 0.14 10*3/mm3      Basophils, Absolute 0.01 10*3/mm3      Immature Grans, Absolute 0.01 10*3/mm3           .  Imaging Results (last 24 hours)     Procedure Component Value Units Date/Time    CT Head Without Contrast [[id number]] Collected:  09/09/18 1454     Updated:  09/09/18 1547    Narrative:       CT SCAN OF THE BRAIN WITHOUT CONTRAST     HISTORY: Poor motor control of left arm and heaviness in left leg.     TECHNIQUE: The CT scan was performed as an emergency procedure through  the brain without contrast.      FINDINGS: There is mild diffuse atrophy and chronic small vessel  ischemic change. There is no evidence of acute intracranial hemorrhage  or mass effect and the visualized sinuses are clear. There is chronic  sclerosis of the left mastoid air cells.                 Radiation dose reduction techniques were utilized, including automated  exposure control and exposure modulation based on body size.     This report was finalized on 9/9/2018 3:43 PM by Dr. Juan Antonio Cox M.D.             For this problem, the following was ordered:  Orders Placed This Encounter   Procedures   • CT Head Without Contrast   • MRI Brain With & Without Contrast   •  MRI Angiogram Head Without Contrast   • MRI Angiogram Neck With & Without Contrast   • [place] Draw   • Comprehensive Metabolic Panel   • Troponin   • CBC Auto Differential   • Hemoglobin A1c   • Lipid Panel   • Potassium   • Magnesium   • Troponin   • Digoxin Level   • Blood Gas, Arterial   • Troponin   • Vitamin B12   • TSH   • Diet Regular; Cardiac, Vegetarian; Lacto-Ovo: Allows Dairy Products & Eggs   • Ambulate Patient - Report tolerance to provider   • Place Sequential Compression Device   • Maintain Sequential Compression Device   • Elevate Head of Bed   • Turn Patient   • Order CT Head Without Contrast for Neurological Decline   • Provide Stroke Education Material   • Nursing Dysphagia Screening (Complete Prior to Giving Anything By Mouth)   • RN to Place Order SLP Consult - Eval & Treat Choosing Reason of RN Dysphagia Screen Failed   • Nurse to Call MD or Nutrition Services for Diet if Patient Passes Dysphagia Screen   • Notify Provider   • NIHSS Assessment   • Saline Lock   • Continuous Cardiac Monitoring   • Continuous Pulse Oximetry   • May Be Off Telemetry for Tests   • ACLS Protocol For Life Threatening Dysrhythmias (Unless Code Status Indicates Otherwise)   • Notify Provider if ACLS Protocol Activated   • Code Status and Medical Interventions:   • LHA (on-call MD unless specified)   • Inpatient Neuro Clinical Specialist Consult   • Inpatient Rehab Admission Consult   • Inpatient Case Management  Consult   • Inpatient Diabetes Educator Consult   • Inpatient Neurology Consult   • OT Consult: Eval & Treat Stroke Patient   • PT Consult: Eval & Treat   • Oxygen Therapy- Nasal Cannula; Titrate for SPO2: 90%   • Oxygen Therapy- Nasal Cannula; Titrate for SPO2: 90%, equal to or greater than   • SLP Consult: Eval & Treat   • POC Glucose Q6H   • ECG 12 Lead   • ECG 12 Lead   • ECG 12 Lead   • Adult Transthoracic Echo Complete W/ Cont if Necessary Per Protocol (With Agitated Saline)   • Insert  Peripheral IV   • Initiate Observation Status   • Light Blue Top   • Green Top (Gel)   • Lavender Top   • Gold Top - SST   • CBC & Differential       Problem List Items Addressed This Visit     * (Principal)Transient cerebral ischemia - Primary      Other Visit Diagnoses     Atypical chest pain              ASSESSMENT/PLAN: This is a 83 y.o. female who has   Past Medical History:   Diagnosis Date   • Acute pain of left shoulder    • Anxiety    • Atherosclerosis of aorta (CMS/HCC) 9/6/2018   • Balance problem    • Yan's esophagus    • Fatigue    • GERD (gastroesophageal reflux disease)    • Health care maintenance    • History of EKG 08/12/2015   • Hyperlipidemia    • Hypothyroidism    • Precordial pain    • Stroke (CMS/HCC)    • Vegetarian     presents with left arm weakness for 20 minutes, currently on dual antiplatelet therapy at home.  Patient is awaiting further stroke workup.    1.  Right hemispheric TIA:  - Stroke labs; B12, TSH, lipid and A1c A1c 5 TSH 3.9 .  - MRI brain/MRA head and neck negative  - Telemetry   - PTOT speech rehabilitation assessment  - DVT prophylaxis  - Swallow study  - Statins  - Echocardiogram.  - Antiplatelet therapy and statins.  - Cardiac key (Holter) to monitor heart for arrhythmias at discharge x 21 days    Patient will be discharged from neurological standpoint    Thank you for allowing us to participate in the care of this patient.    Aman Bailey MD  09/10/18  7:19 AM

## 2018-09-10 NOTE — THERAPY DISCHARGE NOTE
Acute Care - Occupational Therapy Initial Eval/Discharge  [facility]     Patient Name: Becky Arceo  : 1935  MRN: [id number]  Today's Date: 9/10/2018  Onset of Illness/Injury or Date of Surgery: (P) 18  Date of Referral to OT: 18  Referring Physician: Terese      Admit Date: 2018       ICD-10-CM ICD-9-CM   1. Transient cerebral ischemia, unspecified type G45.9 435.9   2. Atypical chest pain R07.89 786.59   3. Difficulty walking R26.2 719.7     Patient Active Problem List   Diagnosis   • Imbalance   • Cerebrovascular accident (CVA) (CMS/HCC)   • Yan's esophagus   • Gastroesophageal reflux disease   • Atherosclerosis of aorta (CMS/HCC)   • Abnormal EKG   • Precordial pain   • Dyspnea on exertion   • Transient cerebral ischemia   • History of lacunar cerebrovascular accident   • Left arm weakness     Past Medical History:   Diagnosis Date   • Acute pain of left shoulder    • Anxiety    • Atherosclerosis of aorta (CMS/HCC) 2018   • Balance problem    • Yan's esophagus    • Fatigue    • GERD (gastroesophageal reflux disease)    • Health care maintenance    • History of EKG 2015   • Hyperlipidemia    • Hypothyroidism    • Precordial pain    • Stroke (CMS/HCC)    • Vegetarian      Past Surgical History:   Procedure Laterality Date   • ANTERIOR AND PELVIC EXENTERATION     • BLADDER SURGERY     • BREAST BIOPSY     • BREAST SURGERY     • COLONOSCOPY N/A 2016    Procedure: COLONOSCOPY;  Surgeon: Arsalan Lee MD;  Location: [facility] ENDOSCOPY;  Service:    • ENDOSCOPY N/A 2016    Procedure: ESOPHAGOGASTRODUODENOSCOPY WITH BIOPSIES (COLD);  Surgeon: Arsalan Lee MD;  Location: [facility] ENDOSCOPY;  Service:    • HYSTERECTOMY     • TONSILLECTOMY            OT ASSESSMENT FLOWSHEET (last 72 hours)      Occupational Therapy Evaluation     Row Name 09/10/18 1431                   OT Evaluation Time/Intention    Subjective Information no complaints  -SO        Document Type  evaluation;discharge evaluation/summary  -SO        Mode of Treatment individual therapy;occupational therapy  -SO        Patient Effort excellent  -SO        Symptoms Noted During/After Treatment none  -SO           General Information    Patient Profile Reviewed? yes  -SO        Referring Physician Terese  -SO        Patient Observations alert;cooperative;agree to therapy  -SO        General Observations of Patient Pt supine in bed  -SO        Prior Level of Function independent:;ADL's  -SO        Equipment Currently Used at Home none  -SO        Pertinent History of Current Functional Problem TIA, LUE numbness  -SO        Existing Precautions/Restrictions no known precautions/restrictions  -SO        Barriers to Rehab none identified  -SO           Cognitive Assessment/Intervention- PT/OT    Orientation Status (Cognition) oriented x 3  -SO        Follows Commands (Cognition) WNL  -SO        Safety Deficit (Cognitive) --   Moves very quickly  -SO           Bed Mobility Assessment/Treatment    Bed Mobility Assessment/Treatment supine-sit;sit-supine  -SO        Supine-Sit [place] (Bed Mobility) conditional independence  -SO        Sit-Supine [place] (Bed Mobility) --   Sitting up EOB  -SO           Functional Mobility    Functional Mobility- Ind. Level supervision required  -SO        Functional Mobility- Device --   Pushing IV pole  -SO        Functional Mobility- Comment Walks to bathroom and back  -SO           Transfer Assessment/Treatment    Transfer Assessment/Treatment sit-stand transfer;stand-sit transfer;toilet transfer  -SO           Sit-Stand Transfer    Sit-Stand [place] (Transfers) supervision  -SO           Stand-Sit Transfer    Stand-Sit [place] (Transfers) supervision  -SO           Toilet Transfer    Type (Toilet Transfer) sit-stand;stand-sit  -SO        [place] Level (Toilet Transfer) supervision  -SO           ADL Assessment/Intervention    BADL Assessment/Intervention  lower body dressing  -SO           Lower Body Dressing Assessment/Training    Lower Body Dressing [place] Level doff;don;socks;conditional independence  -SO        Lower Body Dressing Position unsupported sitting  -SO           General ROM    GENERAL ROM COMMENTS BUE WFL  -SO           MMT (Manual Muscle Testing)    General MMT Comments BUE WFL  -SO           Positioning and Restraints    Pre-Treatment Position in bed  -SO        Post Treatment Position bed  -SO        In Bed sitting EOB;call light within reach;encouraged to call for assist;exit alarm on  -SO           Pain Assessment    Additional Documentation Pain Scale: Numbers Pre/Post-Treatment (Group)  -SO           Pain Scale: Numbers Pre/Post-Treatment    Pain Scale: Numbers, Pretreatment 0/10 - no pain  -SO           Plan of Care Review    Plan of Care Reviewed With patient  -SO           Clinical Impression (OT)    Date of Referral to OT 09/09/18  -SO        Criteria for Skilled Therapeutic Interventions Met (OT Eval) no problems identified which require skilled intervention;current level of function same as previous level of function  -SO        Therapy Frequency (OT Eval) evaluation only  -SO        Anticipated Discharge Disposition (OT) home with assist  -SO           Patient Education Goal (OT)    Activity (Patient Education Goal, OT) Edu to slow down during ADLs, safety during funcitonal mobility heather with IV pole currently.  -SO        [place]/Cues/Accuracy (Memory Goal 2, OT) verbalizes understanding  -SO        Time Frame (Patient Education Goal, OT) short term goal (STG);1 day  -SO        Progress/Outcome (Patient Education Goal, OT) goal met  -SO          User Key  (r) = Recorded By, (t) = Taken By, (c) = Cosigned By    Initials Name Effective Dates    SO Lisette Gaines, OTR 04/13/15 -           Occupational Therapy Education     Title: PT OT SLP Therapies (Resolved)     Topic: Occupational Therapy (Resolved)     Point: ADL  training (Resolved)     Description: Instruct learner(s) on proper safety adaptation and remediation techniques during self care or transfers.   Instruct in proper use of assistive devices.   Learning Progress Summary     Learner Status Readiness Method Response Comment Documented by    Patient Done Acceptance E,TB VU Discussed impulsivity, slowing down to prevent falls with ADLs. SO 09/10/18 1435                      User Key     Initials Effective Dates Name Provider Type Discipline    SO 04/13/15 -  Lisette Gaines OTCARMEN Occupational Therapist OT                OT Recommendation and Plan  Outcome Summary/Treatment Plan (OT)  Anticipated Discharge Disposition (OT): home with assist  Therapy Frequency (OT Eval): evaluation only  Plan of Care Review  Plan of Care Reviewed With: patient  Plan of Care Reviewed With: patient  Outcome Summary: Pt presents to OT eval following TIA. Pt at supervision level with ADLs, moves very quickly but no LOB with toileting. No further OT needs, OT will d/c.           OT Rehab Goals     Row Name 09/10/18 1431             Patient Education Goal (OT)    Activity (Patient Education Goal, OT) Edu to slow down during ADLs, safety during funcitonal mobility heather with IV pole currently.  -SO      [place]/Cues/Accuracy (Memory Goal 2, OT) verbalizes understanding  -SO      Time Frame (Patient Education Goal, OT) short term goal (STG);1 day  -SO      Progress/Outcome (Patient Education Goal, OT) goal met  -SO        User Key  (r) = Recorded By, (t) = Taken By, (c) = Cosigned By    Initials Name Provider Type Discipline    SO Lisette Gaines, OTR Occupational Therapist OT                Outcome Measures     Row Name 09/10/18 1400             How much help from another is currently needed...    Putting on and taking off regular lower body clothing? 4  -SO      Bathing (including washing, rinsing, and drying) 4  -SO      Toileting (which includes using toilet bed pan or urinal)  4  -SO      Putting on and taking off regular upper body clothing 4  -SO      Taking care of personal grooming (such as brushing teeth) 4  -SO      Eating meals 4  -SO      Score 24  -SO         Functional Assessment    Outcome Measure Options AM-PAC 6 Clicks Daily Activity (OT)  -SO        User Key  (r) = Recorded By, (t) = Taken By, (c) = Cosigned By    Initials Name Provider Type    Lisette Walker OTCARMEN Occupational Therapist          Time Calculation:         Time Calculation- OT     Row Name 09/10/18 1436             Time Calculation- OT    OT Start Time 1350  -SO      OT Stop Time 1358  -SO      OT Time Calculation (min) 8 min  -SO      Total Timed Code Minutes- OT 8 minute(s)  -SO      OT Received On 09/10/18  -SO        User Key  (r) = Recorded By, (t) = Taken By, (c) = Cosigned By    Initials Name Provider Type    Lisette Walker OTCARMEN Occupational Therapist        Therapy Suggested Charges     Code   Minutes Charges    None           Therapy Charges for Today     Code Description Service Date Service Provider Modifiers Qty    57604391204 HC OT SELFCARE CURRENT 9/10/2018 Lisette Gaines OTCARMEN ,  1    23770535023 HC OT SELFCARE PROJECTED 9/10/2018 Lisette Gaines OTCARMEN ,  1    76408128960 HC OT SELFCARE DISCHARGE 9/10/2018 Lisette Gaines OTCARMEN , CH 1          OT G-codes  OT Functional Scales Options: AM-PAC 6 Clicks Daily Activity (OT)  Functional Limitation: Self care  Self Care Current Status (): 0 percent impaired, limited or restricted  Self Care Goal Status (): 0 percent impaired, limited or restricted  Self Care Discharge Status (): 0 percent impaired, limited or restricted    OT Discharge Summary  Anticipated Discharge Disposition (OT): home with assist    AMEE Alcazar  9/10/2018

## 2018-09-10 NOTE — THERAPY EVALUATION
Acute Care - Speech Language Pathology   Swallow Initial Evaluation [facility]     Patient Name: Becky Arceo  : 1935  MRN: [id number]  Today's Date: 9/10/2018               Admit Date: 2018    Visit Dx:     ICD-10-CM ICD-9-CM   1. Transient cerebral ischemia, unspecified type G45.9 435.9   2. Atypical chest pain R07.89 786.59     Patient Active Problem List   Diagnosis   • Imbalance   • Cerebrovascular accident (CVA) (CMS/HCC)   • Yan's esophagus   • Gastroesophageal reflux disease   • Atherosclerosis of aorta (CMS/HCC)   • Abnormal EKG   • Precordial pain   • Dyspnea on exertion   • Transient cerebral ischemia   • History of lacunar cerebrovascular accident   • Left arm weakness     Past Medical History:   Diagnosis Date   • Acute pain of left shoulder    • Anxiety    • Atherosclerosis of aorta (CMS/HCC) 2018   • Balance problem    • Yan's esophagus    • Fatigue    • GERD (gastroesophageal reflux disease)    • Health care maintenance    • History of EKG 2015   • Hyperlipidemia    • Hypothyroidism    • Precordial pain    • Stroke (CMS/HCC)    • Vegetarian      Past Surgical History:   Procedure Laterality Date   • ANTERIOR AND PELVIC EXENTERATION     • BLADDER SURGERY     • BREAST BIOPSY     • BREAST SURGERY     • COLONOSCOPY N/A 2016    Procedure: COLONOSCOPY;  Surgeon: Arsalan Lee MD;  Location: [facility] ENDOSCOPY;  Service:    • ENDOSCOPY N/A 2016    Procedure: ESOPHAGOGASTRODUODENOSCOPY WITH BIOPSIES (COLD);  Surgeon: Arsalan Lee MD;  Location: [facility] ENDOSCOPY;  Service:    • HYSTERECTOMY     • TONSILLECTOMY            SWALLOW EVALUATION (last 72 hours)      SLP Adult Swallow Evaluation     Row Name 09/10/18 0945                   Rehab Evaluation    Document Type evaluation  -OC        Subjective Information no complaints  -OC        Patient Observations alert;cooperative;agree to therapy  -OC        Patient Effort good  -OC        Symptoms Noted  During/After Treatment none  -OC           General Information    Patient Profile Reviewed yes  -OC        Pertinent History Of Current Problem Pt admitted for stroke work up, ? TIA.   -OC        Current Method of Nutrition regular textures;thin liquids  -OC        Precautions/Limitations, Vision WFL  -OC        Precautions/Limitations, Hearing WFL  -OC        Prior Level of Function-Communication WFL  -OC        Prior Level of Function-Swallowing no diet consistency restrictions  -OC        Plans/Goals Discussed with patient;agreed upon  -OC        Barriers to Rehab none identified  -OC        Patient's Goals for Discharge patient did not state  -OC           Pain Assessment    Additional Documentation Pain Scale: Numbers Pre/Post-Treatment (Group)  -OC           Pain Scale: Numbers Pre/Post-Treatment    Pain Scale: Numbers, Pretreatment 0/10 - no pain  -OC        Pain Scale: Numbers, Post-Treatment 0/10 - no pain  -OC           Oral Motor and Function    Dentition Assessment upper dentures/partial in place;lower dentures/partial in place  -OC        Secretion Management WNL/WFL  -OC        Mucosal Quality moist, healthy  -OC        Volitional Swallow WFL  -OC        Volitional Cough WFL  -OC           Oral Musculature and Cranial Nerve Assessment    Oral Motor General Assessment WFL  -OC           Clinical Swallow Eval    Oral Prep Phase WFL  -OC        Oral Transit WFL  -OC        Oral Residue WFL  -OC        Pharyngeal Phase no overt signs/symptoms of pharyngeal impairment  -OC        Esophageal Phase suspected esophageal impairment   hx barretts esophagus  -OC        Clinical Swallow Evaluation Summary Bedside swallow eval completed. Pt with throat clear and cough prior to PO trials. Pt reports secondary to phlegm. No overt s/s aspiration with thin, puree, mech soft, and regular textures.   -OC           Clinical Impression    SLP Swallowing Diagnosis functional oral phase;functional pharyngeal phase  -OC         Functional Impact no impact on function  -OC        Rehab Potential/Prognosis, Swallowing good, to achieve stated therapy goals  -OC        Swallow Criteria for Skilled Therapeutic Interventions Met no problems identified which require skilled intervention  -OC           Recommendations    Therapy Frequency (Swallow) evaluation only  -OC        Predicted Duration Therapy Intervention (Days) until discharge  -OC        SLP Diet Recommendation regular textures;thin liquids  -OC        Recommended Precautions and Strategies upright posture during/after eating;small bites of food and sips of liquid  -OC        SLP Rec. for Method of Medication Administration meds whole;with thin liquids;with pudding or applesauce  -OC        Monitor for Signs of Aspiration yes;notify SLP if any concerns  -OC        Anticipated Dischage Disposition other (see comments)   No further ST anticipated  -OC          User Key  (r) = Recorded By, (t) = Taken By, (c) = Cosigned By    Initials Name Effective Dates    OC Brendan, RYLIE Fraga,[facility]-SLP 06/08/18 -         EDUCATION  The patient has been educated in the following areas:   Dysphagia (Swallowing Impairment).    SLP Recommendation and Plan  SLP Swallowing Diagnosis: functional oral phase, functional pharyngeal phase  SLP Diet Recommendation: regular textures, thin liquids  Recommended Precautions and Strategies: upright posture during/after eating, small bites of food and sips of liquid     Monitor for Signs of Aspiration: yes, notify SLP if any concerns     Swallow Criteria for Skilled Therapeutic Interventions Met: no problems identified which require skilled intervention  Anticipated Dischage Disposition: other (see comments)  Rehab Potential/Prognosis, Swallowing: good, to achieve stated therapy goals  Therapy Frequency (Swallow): evaluation only  Predicted Duration Therapy Intervention (Days): until discharge       Plan of Care Reviewed With: patient  Plan of Care Review  Plan of Care  Reviewed With: patient  Outcome Summary: Bedside swallow eval completed. Swallow appears to be within functional limits. No overt s/s aspiration with thin, puree, mech soft, and regular. Pt with cough/throat clear prior to PO trials, pt reports phlegm. Recommend regulat textures and thin liquids,  meds whole with thin. ST to s/o at this time. Please re-consult as warranted.            SLP Outcome Measures (last 72 hours)      SLP Outcome Measures     Row Name 09/10/18 1000             SLP Outcome Measures    Outcome Measure Used? Adult NOMS  -OC         Adult FCM Scores    FCM Chosen Swallowing  -OC      Swallowing FCM Score 7  -OC        User Key  (r) = Recorded By, (t) = Taken By, (c) = Cosigned By    Initials Name Effective Dates    Philly Garland MA, CCC-SLP 06/08/18 -            Time Calculation:         Time Calculation- SLP     Row Name 09/10/18 1124             Time Calculation- SLP    SLP Start Time 1000  -OC      SLP Received On 09/10/18  -        User Key  (r) = Recorded By, (t) = Taken By, (c) = Cosigned By    Initials Name Provider Type    Philly Garland MA, CCC-SLP Speech and Language Pathologist          Therapy Charges for Today     Code Description Service Date Service Provider Modifiers Qty    14431934979 HC ST SWALLOWING CURRENT STATUS 9/10/2018 Philly Cardona MA, CCC-SLP GN, CH 1    16854421831 HC ST SWALLOWING PROJECTED 9/10/2018 Philly Cardona MA, CCC-SLP GN, CH 1    [id number] HC ST SWALLOWING DISCHARGE 9/10/2018 Philly Cardona MA, CCC-SLP GN, CH 1    03106565557 HC ST EVAL ORAL PHARYNG SWALLOW 2 9/10/2018 Philly Cardona MA, CCC-SLP GN, KX 1          SLP G-Codes  SLP NOMS Used?: Yes  Functional Limitations: Swallowing  Swallow Current Status (): 0 percent impaired, limited or restricted  Swallow Goal Status (): 0 percent impaired, limited or restricted  Swallow Discharge Status (): 0 percent impaired, limited or restricted    Philly Cardona MA, CCC-SLP  9/10/2018

## 2018-09-10 NOTE — PLAN OF CARE
Problem: Patient Care Overview  Goal: Plan of Care Review  Outcome: Ongoing (interventions implemented as appropriate)   09/10/18 0342   Coping/Psychosocial   Plan of Care Reviewed With patient;spouse   OTHER   Outcome Summary Pt amb 400' with conditional independence. No lob, guarding or unsteadiness noted with amb or standing activities. Pt does deviate in her path while amb and head turning at the same time. This is baseline for her and she has already finished PT to address this issue . No futher skilled PT required at this time due to pt back to baseline and appears safe to amb on level surfaces.

## 2018-09-10 NOTE — THERAPY EVALUATION
Acute Care - Physical Therapy Initial Evaluation  [facility]     Patient Name: Becky Arceo  : 1935  MRN: [id number]  Today's Date: 9/10/2018   Onset of Illness/Injury or Date of Surgery: 18  Date of Referral to PT: 18  Referring Physician: Terese      Admit Date: 2018    Visit Dx:     ICD-10-CM ICD-9-CM   1. Transient cerebral ischemia, unspecified type G45.9 435.9   2. Atypical chest pain R07.89 786.59   3. Difficulty walking R26.2 719.7     Patient Active Problem List   Diagnosis   • Imbalance   • Cerebrovascular accident (CVA) (CMS/HCC)   • Yan's esophagus   • Gastroesophageal reflux disease   • Atherosclerosis of aorta (CMS/HCC)   • Abnormal EKG   • Precordial pain   • Dyspnea on exertion   • Transient cerebral ischemia   • History of lacunar cerebrovascular accident   • Left arm weakness     Past Medical History:   Diagnosis Date   • Acute pain of left shoulder    • Anxiety    • Atherosclerosis of aorta (CMS/HCC) 2018   • Balance problem    • Yan's esophagus    • Fatigue    • GERD (gastroesophageal reflux disease)    • Health care maintenance    • History of EKG 2015   • Hyperlipidemia    • Hypothyroidism    • Precordial pain    • Stroke (CMS/HCC)    • Vegetarian      Past Surgical History:   Procedure Laterality Date   • ANTERIOR AND PELVIC EXENTERATION     • BLADDER SURGERY     • BREAST BIOPSY     • BREAST SURGERY     • COLONOSCOPY N/A 2016    Procedure: COLONOSCOPY;  Surgeon: Arsalan Lee MD;  Location: [facility] ENDOSCOPY;  Service:    • ENDOSCOPY N/A 2016    Procedure: ESOPHAGOGASTRODUODENOSCOPY WITH BIOPSIES (COLD);  Surgeon: Arsalan Lee MD;  Location: [facility] ENDOSCOPY;  Service:    • HYSTERECTOMY     • TONSILLECTOMY          PT ASSESSMENT (last 12 hours)      Physical Therapy Evaluation     Row Name 09/10/18 1412          PT Evaluation Time/Intention    Subjective Information no complaints  -SV     Document Type evaluation  -SV     Mode of  "Treatment individual therapy  -SV     Total Evaluation Minutes, Physical Therapy 15  -SV     Patient Effort excellent  -SV     Symptoms Noted During/After Treatment none  -SV     Comment reports \"back to normal\"  -SV     Row Name 09/10/18 1412          General Information    Patient Profile Reviewed? yes  -SV     Onset of Illness/Injury or Date of Surgery 09/09/18  -SV     Referring Physician Terese  -SV     Patient Observations alert;cooperative;agree to therapy  -SV     Patient/Family Observations   -SV     General Observations of Patient IV, monitors  -SV     Prior Level of Function independent:   underwent PT due to lacunar infarct: unsteady with head turn  -SV     Equipment Currently Used at Home none  -SV     Pertinent History of Current Functional Problem Pt admit with LUE weakness and LLE \"feeling heavy\". Work up in progress: TIA vs CVA.  -SV     Row Name 09/10/18 1412          Relationship/Environment    Primary Source of Support/Comfort spouse  -SV     Row Name 09/10/18 1412          Resource/Environmental Concerns    Current Living Arrangements home/apartment/condo  -SV     Row Name 09/10/18 1412          Cognitive Assessment/Intervention- PT/OT    Orientation Status (Cognition) oriented x 4  -SV     Follows Commands (Cognition) WFL  -SV     Row Name 09/10/18 1412          Bed Mobility Assessment/Treatment    Bed Mobility Assessment/Treatment supine-sit-supine  -SV     Supine-Sit [place] (Bed Mobility) conditional independence  -SV     Sit-Supine [place] (Bed Mobility) conditional independence  -SV     Row Name 09/10/18 1412          Transfer Assessment/Treatment    Sit-Stand [place] (Transfers) conditional independence  -SV     Stand-Sit [place] (Transfers) conditional independence  -SV     Row Name 09/10/18 1412          Gait/Stairs Assessment/Training    [place] Level (Gait) conditional independence  -SV     Distance in Feet (Gait) 400   no lob, pt with mild deviation in " "path while turning head  -SV     Deviations/Abnormal Patterns (Gait) --   normal to fast daya, no lob, unsteadiness or guarding  -SV     Comment (Gait/Stairs) no lob/guarding/unsteadiness with sidestepping l/r, backward amb, heel raises, minisquats x3(no rail)  -SV     Row Name 09/10/18 1412          General ROM    GENERAL ROM COMMENTS BUE/BLE arom wfl  -SV     Row Name 09/10/18 1412          MMT (Manual Muscle Testing)    General MMT Comments BUE/BLE>3/5 observed with mobility, normal coordination/symmetrical  -SV     Row Name 09/10/18 1412          Pain Assessment    Additional Documentation --   no reports  -SV     Row Name 09/10/18 1412          Physical Therapy Clinical Impression    Date of Referral to PT 09/09/18  -SV     Functional Level at Time of Evaluation (PT Clinical Impression) conditional indep due to TIA work up, recommend supervsion  -SV     Patient/Family Goals Statement (PT Clinical Impression) denies need for further PT\"already finished after lacunar infarct  -SV     Criteria for Skilled Interventions Met (PT Clinical Impression) no  -SV     Row Name 09/10/18 1412          Positioning and Restraints    Pre-Treatment Position in bed  -SV     Post Treatment Position bed  -SV     In Bed fowlers;notified nsg;call light within reach;encouraged to call for assist;with family/caregiver  -       User Key  (r) = Recorded By, (t) = Taken By, (c) = Cosigned By    Initials Name Provider Type    Ana María Welch, PT Physical Therapist              PT Recommendation and Plan  Anticipated Discharge Disposition (PT): home  Therapy Frequency (PT Clinical Impression): evaluation only  Outcome Summary/Treatment Plan (PT)  Anticipated Discharge Disposition (PT): home  Plan of Care Reviewed With: patient, spouse  Outcome Summary: Pt amb 400' with conditional independence. No lob, guarding or unsteadiness noted with amb or standing activities. Pt does deviate in her path while amb and head turning at the same " time. This is baseline for her and she has already finished PT to address this issue . No futher skilled PT required at this time due to pt back to baseline and appears safe to amb on level surfaces.          Outcome Measures     Row Name 09/10/18 1400             How much help from another is currently needed...    Putting on and taking off regular lower body clothing? 4  -SO      Bathing (including washing, rinsing, and drying) 4  -SO      Toileting (which includes using toilet bed pan or urinal) 4  -SO      Putting on and taking off regular upper body clothing 4  -SO      Taking care of personal grooming (such as brushing teeth) 4  -SO      Eating meals 4  -SO      Score 24  -SO         Functional Assessment    Outcome Measure Options AM-PAC 6 Clicks Daily Activity (OT)  -SO        User Key  (r) = Recorded By, (t) = Taken By, (c) = Cosigned By    Initials Name Provider Type    SO Lisette Gaines, OTR Occupational Therapist           Time Calculation:         PT Charges     Row Name 09/10/18 1441             Time Calculation    Start Time 1415  -SV      Stop Time 1430  -SV      Time Calculation (min) 15 min  -SV      PT Received On 09/10/18  -SV        User Key  (r) = Recorded By, (t) = Taken By, (c) = Cosigned By    Initials Name Provider Type    SV Ana María Hernandez, PT Physical Therapist        Therapy Suggested Charges     Code   Minutes Charges    None           Therapy Charges for Today     Code Description Service Date Service Provider Modifiers Qty    04261773531 HC PT EVAL LOW COMPLEXITY 1 9/10/2018 Ana María Hernandez, PT GP, KX 1          PT G-Codes  Outcome Measure Options: AM-PAC 6 Clicks Daily Activity (OT)  Score: 24      Ana María Hernandez PT  9/10/2018

## 2018-09-10 NOTE — DISCHARGE SUMMARY
"                                                                   PHYSICIAN DISCHARGE SUMMARY                                                                        [facility]    Patient Identification:  Name: Becky Arceo  Age: 83 y.o.  Sex: female  :  1935  MRN: [id number]  Primary Care Physician: Giovanni Jones MD    Admit date: 2018  Discharge date and time: 9/10/2018     Discharged Condition: good    Discharge Diagnoses:Principal Problem:    Transient cerebral ischemia  Active Problems:    Gastroesophageal reflux disease    Precordial pain - atypical, chronic.     History of lacunar cerebrovascular accident    Left arm weakness       Hospital Course:  Pleasant 83-year-old female presents after an episode of transient left arm weakness.  Please H&P for full details.  She is admitted and CVA protocol initiated.  MRI MRA were both unremarkable as well as echocardiogram.  There is no recurrence of the symptomatology during the hospitalization.  She did have elevated cholesterol levels with significant only elevated LDLs.  From past records appears this is been under much better control the past.  She's been taking food supplements for this, obviously without success.  She was started on crest door via neurology.  The patient expresses great reluctance to take a statin because \"I read an article\".  After significant discussion she agreed to give a trial of low-dose Crestor.  The RN is just notified me that she may be changing her mind on this again.  Neurology is arranging for a ZIO patch monitor on discharge.    Consults:     Consults     Date and Time Order Name Status Description    2018 1543 Inpatient Neurology Consult Completed     2018 1343 [facility] (on-call MD unless specified) Completed             Discharge Exam:  Physical Exam   Afebrile vital signs stable.  Well-developed well-nourished female in no apparent distress.  Lungs clear to auscultation good air " movement.  Heart regular rate and rhythm.  Extremities with no clubbing cyanosis or edema.  Alert oriented conversant cooperative and pleasant.  Her speech is rapid, almost pressured.       Disposition:  Home    Patient Instructions:      Discharge Medications      New Medications      Instructions Start Date   rosuvastatin 10 MG tablet  Commonly known as:  CRESTOR   5 mg, Oral, Nightly         Continue These Medications      Instructions Start Date   aspirin 81 MG tablet   1 tablet, Oral, Daily      clopidogrel 75 MG tablet  Commonly known as:  PLAVIX   75 mg, Oral, Daily      COQ10 PO   Oral      [employer] GAS RELIEF ULTRA STRENGTH 180 MG capsule  Generic drug:  simethicone   180 mg, Oral, As Needed      Garlic 350 MG tablet   Oral      LORazepam 1 MG tablet  Commonly known as:  ATIVAN   1 tablet, Oral, Daily PRN, For anxiety      Magnesium 100 MG capsule   1 capsule, Oral, Daily      POTASSIUM PO   Oral      PROBIOTIC DAILY capsule   Oral      raNITIdine 150 MG tablet  Commonly known as:  ZANTAC   150 mg, Oral      ranolazine 500 MG 12 hr tablet  Commonly known as:  RANEXA   500 mg, Oral, Every 12 Hours Scheduled         Stop These Medications    Kelp 100 MG tablet     RED YEAST RICE PO          Diet Instructions     Diet: Cardiac       Discharge Diet:  Cardiac        Future Appointments  Date Time Provider Department Center   9/12/2018 3:30 PM ANU [facility] E[facility]/VAS FRONT [facility] ECHO ANU     Additional Instructions for the Follow-ups that You Need to Schedule     Discharge Follow-up with PCP    As directed      Currently Documented PCP:  Giovanni Jones MD  PCP Phone Number:  [phone]    Follow Up Details:  1 week         Discharge Follow-up with Specialty: Neurology    As directed      Specialty:  Neurology           Follow-up Information     Giovanni Jones MD .    Specialty:  Family Medicine  Why:  1 week  Contact information:  [address] [place] [id number] [phone]                  Discharge Order     Start     Ordered    09/10/18 1623  Discharge patient  Once     Expected Discharge Date:  09/10/18    Discharge Disposition:  Home or Self Care    Physician of Record for Attribution - Please select from Treatment Team:  TWAN JEFFERS [[id number]]    Review needed by CMO to determine Physician of Record:  No       Question Answer Comment   Physician of Record for Attribution - Please select from Treatment Team TWAN JEFFERS    Review needed by CMO to determine Physician of Record No        09/10/18 1624            Total time spent discharging patient including evaluation,post hospitalization follow up,  medication and post hospitalization instructions and education total time exceeds 30 minutes.    Signed:  Twan Jeffers MD  9/10/2018  4:25 PM    EMR Dragon/Transcription disclaimer:   Much of this encounter note is an electronic transcription/translation of spoken language to printed text. The electronic translation of spoken language may permit erroneous, or at times, nonsensical words or phrases to be inadvertently transcribed; Although I have reviewed the note for such errors, some may still exist.

## 2018-09-10 NOTE — PLAN OF CARE
Problem: Patient Care Overview  Goal: Plan of Care Review  Outcome: Ongoing (interventions implemented as appropriate)   09/10/18 1438 09/10/18 1442   Coping/Psychosocial   Plan of Care Reviewed With patient;spouse --    OTHER   Outcome Summary --  Patient is doing well. No pain. VSS. RM air. Up at [facility]. Awaiting UA. No falls. MRI-. NIH 0 though left leg feels heavy. CTM      Goal: Individualization and Mutuality  Outcome: Ongoing (interventions implemented as appropriate)    Goal: Discharge Needs Assessment  Outcome: Ongoing (interventions implemented as appropriate)    Goal: Interprofessional Rounds/Family Conf  Outcome: Ongoing (interventions implemented as appropriate)

## 2018-09-11 ENCOUNTER — READMISSION MANAGEMENT (OUTPATIENT)
Dept: CALL CENTER | Facility: HOSPITAL | Age: 83
End: 2018-09-11

## 2018-09-11 NOTE — PROGRESS NOTES
Case Management Discharge Note    Final Note: Pt was dc'd home    Destination     No service has been selected for the patient.      Durable Medical Equipment     No service has been selected for the patient.      Dialysis/Infusion     No service has been selected for the patient.      Home Medical Care     No service has been selected for the patient.      Social Care     No service has been selected for the patient.        Other: Other    Final Discharge Disposition Code: 01 - home or self-care

## 2018-09-11 NOTE — OUTREACH NOTE
Prep Survey      Responses   Facility patient discharged from?  [place]   Is patient eligible?  Yes   Discharge diagnosis  Transient cerebral ischemia   Does the patient have one of the following disease processes/diagnoses(primary or secondary)?  Stroke (TIA)   Does the patient have Home health ordered?  No   Is there a DME ordered?  No   Comments regarding appointments  neurology arranging for ZIO patch monitor   Medication alerts for this patient  pt was reluctant to start crestor   Prep survey completed?  Yes          Maritza Nguyen RN

## 2018-09-12 ENCOUNTER — READMISSION MANAGEMENT (OUTPATIENT)
Dept: CALL CENTER | Facility: HOSPITAL | Age: 83
End: 2018-09-12

## 2018-09-12 NOTE — OUTREACH NOTE
Stroke Week 1 Survey      Responses   Facility patient discharged from?  [place]   Does the patient have one of the following disease processes/diagnoses(primary or secondary)?  Stroke (TIA)   Is there a successful TCM telephone encounter documented?  No   Week 1 attempt successful?  Yes   Call start time  1411   Rescheduled  Rescheduled-pt requested   Call end time  1411   Discharge diagnosis  Transient cerebral ischemia          Sandra oFrd RN

## 2018-09-15 ENCOUNTER — READMISSION MANAGEMENT (OUTPATIENT)
Dept: CALL CENTER | Facility: HOSPITAL | Age: 83
End: 2018-09-15

## 2018-09-15 NOTE — OUTREACH NOTE
Stroke Week 1 Survey      Responses   Facility patient discharged from?  [place]   Does the patient have one of the following disease processes/diagnoses(primary or secondary)?  Stroke (TIA)   Is there a successful TCM telephone encounter documented?  No   Week 1 attempt successful?  No   Rescheduled  Revoked [Previous reschedule, no answer today. ]          Janna Bee RN

## 2018-09-17 ENCOUNTER — TELEPHONE (OUTPATIENT)
Dept: CARDIOLOGY | Facility: CLINIC | Age: 83
End: 2018-09-17

## 2018-09-17 NOTE — TELEPHONE ENCOUNTER
----- Message from CHEIKH Mary sent at 9/6/2018  3:46 PM EDT -----    Follow-up on echocardiogram 9/12  Reassess if patient wants to proceed with cardiac catheterization or weight for follow-up appointment in 6 weeks  Reassess symptoms on ranolazine 500 mg twice daily

## 2018-09-18 NOTE — TELEPHONE ENCOUNTER
Echo done in hospital 9/9. She does not want to have the heart cath done and she thinks ranexa is helping her chest pain.  She is wearing the or Mark day monitor at this time and assess to come off on 9/24.    Richar- please arrange a f/u appt with Dr. Sarmiento in 3-4 weeks. Thanks.

## 2018-09-26 ENCOUNTER — TELEPHONE (OUTPATIENT)
Dept: NEUROLOGY | Facility: CLINIC | Age: 83
End: 2018-09-26

## 2018-09-27 ENCOUNTER — TELEPHONE (OUTPATIENT)
Dept: NEUROLOGY | Facility: CLINIC | Age: 83
End: 2018-09-27

## 2018-09-27 NOTE — TELEPHONE ENCOUNTER
----- Message from CHEIKH Blandon sent at 9/25/2018  7:46 AM EDT -----  Regarding: RE: 3 month f/u  Yes I can see her.   ----- Message -----  From: Rhina Garrett MA  Sent: 9/24/2018  11:14 AM  To: CHEIKH Blandon  Subject: FW: 3 month f/u                                  Dr. Bailey only saw pt in hospital. Do you want to see pt in 3 months?    ----- Message -----  From: Sarah Gerard RN  Sent: 9/21/2018   3:26 PM  To: Rhina Garrett MA  Subject: 3 month f/u                                      Pt was seen by Dr. Bailey for TIA. Was discharged 9/10. She will need a 3 month follow up.     Thank you!

## 2018-09-27 NOTE — TELEPHONE ENCOUNTER
I s/w pt and her  and scheduled f/u with Radha on 4/24/19 at 11am. Mailed packet today. Pt would have been scheduled in December, but they go to [facility] from October-April, so we scheduled f/u when they return.

## 2018-10-05 PROCEDURE — 0298T HOLTER MONITOR - 72 HOUR UP TO 21 DAY: CPT | Performed by: INTERNAL MEDICINE

## 2018-10-08 ENCOUNTER — TELEPHONE (OUTPATIENT)
Dept: CARDIOLOGY | Facility: CLINIC | Age: 83
End: 2018-10-08

## 2018-10-08 NOTE — TELEPHONE ENCOUNTER
Interpretation Summary     Monitor:     · An abnormal monitor study.  · NSR WITH NUMEROUS NON SUSTAINED SVTS          Study Description Monitor hooked-up on 9/10/2018 at 16:50 EDT. The monitor was scanned on 10/3/2018. The patient was monitored for 12 days 19 hours. Indications for this exam include A-Fib. Total beats: [id number]. Average HR: 65. Min HR: 45. Max HR: 125.      Study Findings Patient diary was not submitted.No symptoms reported during the monitoring period. No complications noted. The predominant rhythm noted during the testing period was sinus rhythm. Premature atrial contractions occured frequently. There were 715 episodes of supraventricular tachycardia. The peak heart rate was 168 beats per minute. Premature ventricular contractions occured rarely. There were no episodes of ventricular tachycardia. Sinoatrial node conduction was normal. No atrioventricular block noted.      Study Impressions An abnormal monitor study.

## 2018-10-08 NOTE — TELEPHONE ENCOUNTER
----- Message from CHEIKH Mary sent at 10/4/2018  1:32 PM EDT -----    10/4: Results pending    ----- Message -----  From: Kristi Maddox APRN  Sent: 9/18/2018   4:16 PM  To: CHEIKH Mary      Follow-up on 14 day monitor

## 2018-10-08 NOTE — TELEPHONE ENCOUNTER
Patient informed about results. I have recommended that she further discuss with Dr. Brand during her office appointment on 10/11.

## 2018-10-11 ENCOUNTER — OFFICE VISIT (OUTPATIENT)
Dept: CARDIOLOGY | Facility: CLINIC | Age: 83
End: 2018-10-11

## 2018-10-11 VITALS
DIASTOLIC BLOOD PRESSURE: 78 MMHG | HEART RATE: 61 BPM | HEIGHT: 64 IN | BODY MASS INDEX: 20.32 KG/M2 | WEIGHT: 119 LBS | SYSTOLIC BLOOD PRESSURE: 124 MMHG

## 2018-10-11 DIAGNOSIS — Z86.73 HISTORY OF LACUNAR CEREBROVASCULAR ACCIDENT: ICD-10-CM

## 2018-10-11 DIAGNOSIS — G45.1 HEMISPHERIC CAROTID ARTERY SYNDROME: Primary | ICD-10-CM

## 2018-10-11 PROCEDURE — 93000 ELECTROCARDIOGRAM COMPLETE: CPT | Performed by: INTERNAL MEDICINE

## 2018-10-11 PROCEDURE — 99214 OFFICE O/P EST MOD 30 MIN: CPT | Performed by: INTERNAL MEDICINE

## 2018-10-11 NOTE — PROGRESS NOTES
Date of Office Visit: 10/11/2018  Encounter Provider: Marlen Brand MD  Place of Service: [facility] CARDIOLOGY  Patient Name: Becky Arceo  :1935      Patient ID:  Becky Arceo is a 83 y.o. female is here for  followup for TIA.         History of Present Illness    She presented to [facility] early 2014 with difficulty moving her  legs, left being much worse than her right. She had a CT of the head done at [facility], which was unremarkable, and she was transferred emergently to [facility] where she was found to have a 3 mm lacunar fusion restriction at the right  lentiform nucleus, which was acute subacute lacunar infarct. There was a chronic ischemic  small vessel disease noted as well. This was indicated for a left lower extremity numbness  and weakness. She did have an MRA done as well, at [facility] done 2014  showing no hemodynamically significant stenosis or aneurysm in the major arteries of the  neck. She also had an MRA done of the cerebral circulation showing no stenosis or aneurysm  of the cerebral and the other circulation there seems to be fine. She went on to have a  transesophageal echocardiogram done 2014, which showed ejection fraction of 74%,  grade 1 diastolic dysfunction, severe atherosclerotic plaque in the descending aorta,  severe atherosclerotic plaque in the aortic arch with mobile plaques noted. There was no  left atrial mass, no thrombus in the left atrial appendage, no evidence of an atrioseptal  defect, and the chamber sizes were fine. She is here because of that.      She has been a vegetarian and exercises regularly. She really is not interested in taking  medications, so we had a long discussion about that.      She has a history of renal calculi and gastroesophageal reflux  disease. She has a history of hypothyroidism as well. She has had anemia in the past.      She has  never smoked, and she lives with her . They have maintained high activity  levels for quite some time. She is not yet seeing a neurologist for the stroke.       She could not take her statin because she said it caused severe muscle  aching and fatigue, but she is on red yeast rice which she has been able to tolerate.      She continues to see Dr. Lee for her gastrointestinal issues.       She had a normal stress echocardiogram done 10/20/16.    CTA of the chest completed 7/24/18 showed no evidence of pulmonary emboli, 3.8 cm at take easy of the ascending thoracic aorta and a tiny hiatal hernia.  myocardial perfusion stress test completed 7/24/18 showed a small-sized infarct in the apical region and no ischemia noted with an EF of 67%.  Overall impression consistent with a low risk study.  This was done for chest pain.     She was hospitalized 9/9/18 after an episode of transient left arm weakness.  She was able to be discharged with Zio patch.   It was felt that this could've been a TIA.  Her desire patch was abnormal showing multiple episodes of nonsustained tachycardia.  She had an echocardiogram done 9/10/18 showing grade 1 diastolic dysfunction and LVEF 53%.  She was recommended to be on Crestor which she took only during hospitalization.    She is now off of Crestor because she stopped the day she left the hospital.  She refuses to take any statin.  The CT done of her chest in July shows extensive aortic arch and arch vessel calcification.  This is likely in part the source of her strokes.  She doesn't feel her heart racing or skipping.  She's had no dizziness or syncope.  She has no chest pain or pressure.  She has no orthopnea or PND.  The ranexa has really helped her chest pain.        Past Medical History:   Diagnosis Date   • Acute pain of left shoulder    • Anxiety    • Atherosclerosis of aorta (CMS/HCC) 9/6/2018   • Balance problem    • Yan's esophagus    • Fatigue    • GERD (gastroesophageal  reflux disease)    • Health care maintenance    • History of EKG 08/12/2015   • Hyperlipidemia    • Hypothyroidism    • Left arm weakness    • Precordial pain    • Stroke (CMS/HCC)    • Transient cerebral ischemia    • Vegetarian          Past Surgical History:   Procedure Laterality Date   • ANTERIOR AND PELVIC EXENTERATION     • BLADDER SURGERY     • BREAST BIOPSY     • BREAST SURGERY     • COLONOSCOPY N/A 8/31/2016    Procedure: COLONOSCOPY;  Surgeon: Arsalan Lee MD;  Location: [facility] ENDOSCOPY;  Service:    • ENDOSCOPY N/A 8/31/2016    Procedure: ESOPHAGOGASTRODUODENOSCOPY WITH BIOPSIES (COLD);  Surgeon: Arsalan Lee MD;  Location: [facility] ENDOSCOPY;  Service:    • HYSTERECTOMY     • TONSILLECTOMY         Current Outpatient Prescriptions on File Prior to Visit   Medication Sig Dispense Refill   • aspirin 81 MG tablet Take 1 tablet by mouth Daily.     • clopidogrel (PLAVIX) 75 MG tablet Take 1 tablet by mouth Daily. 90 tablet 1   • Coenzyme Q10 (COQ10 PO) Take  by mouth.     • [employer] GAS RELIEF ULTRA STRENGTH 180 MG capsule Take 180 mg by mouth As Needed.  5   • Garlic 350 MG tablet Take  by mouth.     • LORazepam (ATIVAN) 1 MG tablet Take 1 tablet by mouth Daily As Needed. For anxiety     • Magnesium 100 MG capsule Take 1 capsule by mouth Daily.     • POTASSIUM PO Take  by mouth.     • Probiotic Product (PROBIOTIC DAILY) capsule Take  by mouth.     • raNITIdine (ZANTAC) 150 MG tablet Take 150 mg by mouth.     • ranolazine (RANEXA) 500 MG 12 hr tablet Take 1 tablet by mouth Every 12 (Twelve) Hours. 84 tablet 0   • rosuvastatin (CRESTOR) 10 MG tablet Take 0.5 tablets by mouth Every Night. 30 tablet 0     No current facility-administered medications on file prior to visit.        Social History     Social History   • Marital status:      Spouse name: N/A   • Number of children: N/A   • Years of education: N/A     Occupational History   • Not on file.     Social History Main Topics   • Smoking status:  "Never Smoker   • Smokeless tobacco: Never Used      Comment: caffeine use   • Alcohol use No   • Drug use: No   • Sexual activity: Defer     Other Topics Concern   • Not on file     Social History Narrative   • No narrative on file           Review of Systems   Constitution: Negative.   HENT: Negative for congestion.    Eyes: Negative for vision loss in left eye and vision loss in right eye.   Respiratory: Negative.  Negative for cough, hemoptysis, shortness of breath, sleep disturbances due to breathing, snoring, sputum production and wheezing.    Endocrine: Negative.    Hematologic/Lymphatic: Negative.    Skin: Negative for poor wound healing and rash.   Musculoskeletal: Negative for falls, gout, muscle cramps and myalgias.   Gastrointestinal: Negative for abdominal pain, diarrhea, dysphagia, hematemesis, melena, nausea and vomiting.   Neurological: Negative for excessive daytime sleepiness, dizziness, headaches, light-headedness, loss of balance, seizures and vertigo.   Psychiatric/Behavioral: Negative for depression and substance abuse. The patient is not nervous/anxious.        Procedures    ECG 12 Lead  Date/Time: 10/11/2018 1:40 PM  Performed by: TIM BORGES  Authorized by: TIM BORGES   Comparison: compared with previous ECG   Similar to previous ECG  Rhythm: sinus rhythm  Clinical impression: normal ECG                Objective:      Vitals:    10/11/18 1321   BP: 124/78   BP Location: Left arm   Patient Position: Sitting   Pulse: 61   Weight: 54 kg (119 lb)   Height: 162.6 cm (64.02\")     Body mass index is 20.42 kg/m².    Physical Exam   Constitutional: She is oriented to person, place, and time. She appears well-developed and well-nourished. No distress.   HENT:   Head: Normocephalic and atraumatic.   Eyes: Conjunctivae are normal. No scleral icterus.   Neck: Neck supple. No JVD present. Carotid bruit is not present. No thyromegaly present.   Cardiovascular: Normal rate, regular rhythm, " S1 normal, S2 normal, normal heart sounds and intact distal pulses.   No extrasystoles are present. PMI is not displaced.  Exam reveals no gallop.    No murmur heard.  Pulses:       Carotid pulses are 2+ on the right side, and 2+ on the left side.       Radial pulses are 2+ on the right side, and 2+ on the left side.        Dorsalis pedis pulses are 2+ on the right side, and 2+ on the left side.        Posterior tibial pulses are 2+ on the right side, and 2+ on the left side.   Pulmonary/Chest: Effort normal and breath sounds normal. No respiratory distress. She has no wheezes. She has no rhonchi. She has no rales. She exhibits no tenderness.   Abdominal: Soft. Bowel sounds are normal. She exhibits no distension, no abdominal bruit and no mass. There is no tenderness.   Musculoskeletal: She exhibits no edema or deformity.   Lymphadenopathy:     She has no cervical adenopathy.   Neurological: She is alert and oriented to person, place, and time. No cranial nerve deficit.   Skin: Skin is warm and dry. No rash noted. She is not diaphoretic. No cyanosis. No pallor. Nails show no clubbing.   Psychiatric: She has a normal mood and affect. Judgment normal.   Vitals reviewed.      Lab Review:       Assessment:      Diagnosis Plan   1. Hemispheric carotid artery syndrome     2. History of lacunar cerebrovascular accident       1. Stroke syndrome 06/2014, specifically 06/01/2014 or 06/02/2014. MRA shows no  abnormalities, poor circulation in the brain, and her carotid arteries looked fine. The  stroke affected the left side. It was a right lacunar stroke. A NAHEED shows a significant  atherosclerotic plaque of the aortic arch with mobile plaques. Her ziopatch now shows nonsustained SVT.  She had a TIA 9/2018.   2. Hyperlipidemia. She cannot take Atorvastatin due to muscle pain and refuses other statins. Convinced to try crestor at hospitalization 9/2018.   3. Family history of strokes in her sister.   4. Esophageal reflux,  stable. H/o Yan's esophagus.  5. Chest pain, normal PET stress study 10/2014. She now has exertional chest pain with an abnormal ecg, normal stress echo 10/2016. Dyspnea on exertion with chest tightness, worsening since 5/2018, set up CTA chest and stress nuclear study.   6. Abdominal pain, seeing Dr. Lee.        Plan:       See rik in 6 months, stop asa and plavix and start eliquis 5mg bid for pSVT, likely a fib on ziopatch.

## 2018-10-17 NOTE — TELEPHONE ENCOUNTER
Received a letter from pt pharmacy that Eliquis 5mg is greater dose for 81 years of age.      Send a new Rx for Eliquis 2.5 mg BID per MONY Turner

## 2019-06-11 ENCOUNTER — TELEPHONE (OUTPATIENT)
Dept: CARDIOLOGY | Facility: CLINIC | Age: 84
End: 2019-06-11

## 2019-06-11 NOTE — TELEPHONE ENCOUNTER
Leah with  is requesting clearance. She is having a EGD on 6/14/19 (Friday). Is it ok to hold her eliquis 1 day prior?     #: [phone]  FAX #: [phone]    Thanks Terri

## 2019-07-11 ENCOUNTER — TRANSCRIBE ORDERS (OUTPATIENT)
Dept: ADMINISTRATIVE | Facility: HOSPITAL | Age: 84
End: 2019-07-11

## 2019-07-11 DIAGNOSIS — Z12.31 VISIT FOR SCREENING MAMMOGRAM: Primary | ICD-10-CM

## 2019-08-02 ENCOUNTER — HOSPITAL ENCOUNTER (OUTPATIENT)
Dept: MAMMOGRAPHY | Facility: HOSPITAL | Age: 84
Discharge: HOME | End: 2019-08-02
Admitting: FAMILY MEDICINE

## 2019-08-02 DIAGNOSIS — Z12.31 VISIT FOR SCREENING MAMMOGRAM: ICD-10-CM

## 2019-08-02 PROCEDURE — 77063 BREAST TOMOSYNTHESIS BI: CPT

## 2019-08-02 PROCEDURE — 77067 SCR MAMMO BI INCL CAD: CPT

## 2019-08-16 ENCOUNTER — TRANSCRIBE ORDERS (OUTPATIENT)
Dept: ADMINISTRATIVE | Facility: HOSPITAL | Age: 84
End: 2019-08-16

## 2019-08-16 DIAGNOSIS — R92.8 ABNORMAL MAMMOGRAM: Primary | ICD-10-CM

## 2019-08-21 ENCOUNTER — APPOINTMENT (OUTPATIENT)
Dept: MAMMOGRAPHY | Facility: HOSPITAL | Age: 84
End: 2019-08-21

## 2019-08-22 ENCOUNTER — HOSPITAL ENCOUNTER (OUTPATIENT)
Dept: MAMMOGRAPHY | Facility: HOSPITAL | Age: 84
Discharge: HOME | End: 2019-08-22
Admitting: FAMILY MEDICINE

## 2019-08-22 DIAGNOSIS — R92.8 ABNORMAL MAMMOGRAM: ICD-10-CM

## 2019-08-22 PROCEDURE — 77065 DX MAMMO INCL CAD UNI: CPT

## 2020-03-09 ENCOUNTER — TELEPHONE (OUTPATIENT)
Dept: CARDIOLOGY | Facility: CLINIC | Age: 85
End: 2020-03-09

## 2020-03-09 NOTE — TELEPHONE ENCOUNTER
PT's  called in today. Says that Pt's heart rate is erratic. I will drop to 40 lowest and highest 90. Pt is easily fatigued. Pt denies chest pain.     Pt sees RM. I scheduled her to come in and see TK Wednesday at 10:00. I let the pt's  know to call our office back if symptoms worsen or go to ER after hours. Pt's  understood.     Thanks  SW

## 2020-06-18 ENCOUNTER — APPOINTMENT (OUTPATIENT)
Dept: GENERAL RADIOLOGY | Facility: HOSPITAL | Age: 85
End: 2020-06-18

## 2020-06-18 ENCOUNTER — HOSPITAL ENCOUNTER (EMERGENCY)
Facility: HOSPITAL | Age: 85
Discharge: HOME | End: 2020-06-18
Attending: EMERGENCY MEDICINE | Admitting: EMERGENCY MEDICINE

## 2020-06-18 VITALS
SYSTOLIC BLOOD PRESSURE: 141 MMHG | WEIGHT: 124 LBS | RESPIRATION RATE: 12 BRPM | OXYGEN SATURATION: 97 % | DIASTOLIC BLOOD PRESSURE: 75 MMHG | BODY MASS INDEX: 20.66 KG/M2 | TEMPERATURE: 98.1 F | HEART RATE: 77 BPM | HEIGHT: 65 IN

## 2020-06-18 DIAGNOSIS — S80.02XD CONTUSION OF LEFT KNEE, SUBSEQUENT ENCOUNTER: ICD-10-CM

## 2020-06-18 DIAGNOSIS — S63.502D LEFT WRIST SPRAIN, SUBSEQUENT ENCOUNTER: ICD-10-CM

## 2020-06-18 DIAGNOSIS — S20.212A CONTUSION OF LEFT CHEST WALL, INITIAL ENCOUNTER: Primary | ICD-10-CM

## 2020-06-18 PROCEDURE — 99282 EMERGENCY DEPT VISIT SF MDM: CPT

## 2020-06-18 PROCEDURE — 99282 EMERGENCY DEPT VISIT SF MDM: CPT | Performed by: EMERGENCY MEDICINE

## 2020-06-18 PROCEDURE — 71101 X-RAY EXAM UNILAT RIBS/CHEST: CPT

## 2020-06-18 RX ORDER — ASPIRIN 81 MG/1
81 TABLET, CHEWABLE ORAL DAILY
COMMUNITY
End: 2021-04-27

## 2020-06-18 RX ORDER — TRAMADOL HYDROCHLORIDE 50 MG/1
50 TABLET ORAL EVERY 6 HOURS PRN
Qty: 24 TABLET | Refills: 0 | Status: SHIPPED | OUTPATIENT
Start: 2020-06-18 | End: 2021-04-27

## 2020-06-18 NOTE — ED NOTES
PT fell last Wed. C/o left rib pain getting worse not better. Was seen in Urgent care last Friday in [place] x-ray done was told no broken ribs.Pt wearing mask.O2 Sats 100%     Brittnee Torres RN  06/18/20 [id number]

## 2020-06-18 NOTE — ED PROVIDER NOTES
Subjective     History provided by:  Patient    History of Present Illness    · Chief complaint: Fall    · Location: Injury to the left anterior chest wall under and below the breast.  Injury to the left wrist along the radial aspect and left knee.    · Quality/Severity: Swelling pain and is colorization of the left knee.  Soreness of the left wrist along the radial aspect.  Sharp pain in the rib cage under the left breast that is getting worse.    · Timing/Onset: The fall occurred last Wednesday 8 days ago.    · Modifying Factors: Breathing and movement exacerbates the pain in the left anterior rib cage.  Range of motion of the left wrist exacerbates pain.    · Associated symptoms: She denies any shortness of breath.  Denies striking her head or any loss of consciousness.    · Narrative: The patient is a 85-year-old white female who states she tripped over a couple of breaks 9 days ago and fell forward landing striking her left chest wall and left outstretched hand injuring her left wrist and striking her left knee.  She was seen at an urgent care in [place] 7 days ago at which time they obtain x-rays of her chest, left wrist and left knee and she was told that she had nothing broken.  Her left wrist was placed in a splint.  Her left wrist is started to improve and the swelling and pain of her left knee is starting to improve.  She is able to walk and bear weight on her left lower extremity without difficulty.  The pain in her left anterior chest wall under her left breast has gotten worse.  It is exacerbated by breathing and moving.  She was not prescribed any pain medication.    Review of Systems   Constitutional: Negative for activity change, appetite change, chills, diaphoresis, fatigue and fever.   HENT: Negative for congestion, dental problem, ear pain, hearing loss, mouth sores, postnasal drip, rhinorrhea, sinus pressure, sore throat and voice change.    Eyes: Negative for photophobia, pain, discharge,  "redness and visual disturbance.   Respiratory: Negative for cough, chest tightness, shortness of breath, wheezing and stridor.    Cardiovascular: Negative for chest pain, palpitations and leg swelling.   Gastrointestinal: Negative for abdominal pain, diarrhea, nausea and vomiting.   Genitourinary: Negative for difficulty urinating, dysuria, flank pain, frequency, hematuria and urgency.   Musculoskeletal: Positive for joint swelling ( Left knee). Negative for arthralgias, back pain, gait problem, myalgias, neck pain and neck stiffness.   Skin: Positive for color change ( Left knee). Negative for rash.   Neurological: Negative for dizziness, tremors, seizures, syncope, facial asymmetry, speech difficulty, weakness, light-headedness, numbness and headaches.   Hematological: Negative for adenopathy.   Psychiatric/Behavioral: Negative.  Negative for confusion and decreased concentration. The patient is not nervous/anxious.      Past Medical History:   Diagnosis Date   • Acute pain of left shoulder    • Anxiety    • Atherosclerosis of aorta (CMS/HCC) 9/6/2018   • Balance problem    • Yan's esophagus    • Fatigue    • GERD (gastroesophageal reflux disease)    • Health care maintenance    • History of EKG 08/12/2015   • Hyperlipidemia    • Hypothyroidism    • Left arm weakness    • Precordial pain    • Stroke (CMS/HCC)    • Transient cerebral ischemia    • Vegetarian      /75 (BP Location: Right arm, Patient Position: Sitting)   Pulse 77   Temp 98.1 °F (36.7 °C) (Oral)   Resp 12   Ht 165.1 cm (65\")   Wt 56.2 kg (124 lb)   SpO2 100%   BMI 20.63 kg/m²     Past Medical History:   Diagnosis Date   • Acute pain of left shoulder    • Anxiety    • Atherosclerosis of aorta (CMS/HCC) 9/6/2018   • Balance problem    • Yan's esophagus    • Fatigue    • GERD (gastroesophageal reflux disease)    • Health care maintenance    • History of EKG 08/12/2015   • Hyperlipidemia    • Hypothyroidism    • Left arm weakness  " "  • Precordial pain    • Stroke (CMS/HCC)    • Transient cerebral ischemia    • Vegetarian        Allergies   Allergen Reactions   • Amoxicillin Itching   • Cephalexin Unknown (See Comments)     Unknown     • Penicillins Unknown (See Comments)     unknown   • Promethazine    • Doxycycline Anxiety     PT had \"a bad attack\" on this medication.  Told not to take this medication again.        Past Surgical History:   Procedure Laterality Date   • ANTERIOR AND PELVIC EXENTERATION     • BLADDER SURGERY     • BREAST BIOPSY     • BREAST SURGERY     • COLONOSCOPY N/A 8/31/2016    Procedure: COLONOSCOPY;  Surgeon: Arsalan Lee MD;  Location: [facility] ENDOSCOPY;  Service:    • ENDOSCOPY N/A 8/31/2016    Procedure: ESOPHAGOGASTRODUODENOSCOPY WITH BIOPSIES (COLD);  Surgeon: Arsalan Lee MD;  Location: [facility] ENDOSCOPY;  Service:    • HYSTERECTOMY     • TONSILLECTOMY         Family History   Problem Relation Age of Onset   • Heart failure Mother    • Stroke Sister    • Hypertension Sister    • Rheumatic fever Brother        Social History     Socioeconomic History   • Marital status:      Spouse name: Not on file   • Number of children: Not on file   • Years of education: Not on file   • Highest education level: Not on file   Tobacco Use   • Smoking status: Never Smoker   • Smokeless tobacco: Never Used   • Tobacco comment: caffeine use   Substance and Sexual Activity   • Alcohol use: No   • Drug use: No   • Sexual activity: Defer           Objective   Physical Exam   Constitutional: She is oriented to person, place, and time. She appears well-developed and well-nourished. No distress.   The patient appears healthy in pain when she moves and breaths.  Review of her vital signs: She is afebrile and all her vital signs within normal limits.   HENT:   Head: Normocephalic and atraumatic.   Nose: Nose normal.   Mouth/Throat: Oropharynx is clear and moist. No oropharyngeal exudate.   Eyes: Pupils are equal, round, and " reactive to light. EOM are normal. Right eye exhibits no discharge. Left eye exhibits no discharge. No scleral icterus.   Neck: Normal range of motion. Neck supple. No JVD present. No thyromegaly present.   Cardiovascular: Normal rate, regular rhythm and normal heart sounds.   No murmur heard.  Pulmonary/Chest: Effort normal and breath sounds normal. She has no wheezes. She has no rales. She exhibits tenderness.   The patient has marked tenderness of the anterior lower ribs below the left breast.  There is no fracture crepitance or subcutaneous air.   Abdominal: Soft. Bowel sounds are normal. She exhibits no distension. There is no tenderness. There is no guarding.   Musculoskeletal: Normal range of motion. She exhibits no edema, tenderness or deformity.   The patient has tenderness of the chest wall under the left breast.  The left wrist is not swollen and has no bony deformity.  Left wrist has no snuffbox tenderness.  There is some mild tenderness to palpation along the radial aspect of the first metacarpal and over the radial aspect of the distal radius.  She has good range of motion of the left wrist without significant discomfort.  The left knee has some mild swelling and subacute brownish ecchymosis on the inferior aspect of the knee.  There is soft tissue tenderness of the anterior left knee.  No bony deformity of the left knee.  The left knee has good range of motion and no ligamental laxity.   Lymphadenopathy:     She has no cervical adenopathy.   Neurological: She is alert and oriented to person, place, and time. No cranial nerve deficit. Coordination normal.   No focal motor sensory deficit   Skin: Skin is warm and dry. No rash noted. She is not diaphoretic.   Brownish subacute ecchymosis of the anterior aspect of the left knee.   Psychiatric: She has a normal mood and affect. Her behavior is normal. Judgment and thought content normal.   Nursing note and vitals reviewed.      Procedures           ED  Course  ED Course as of Jun 18 1054   u Jun 18, 2020   0940 [facility] report [id number]    [TP]   1024 The patient has x-rays of the chest and left rib detail showed no acute rib fractures and no pneumo or hemothorax to mine and the radiologist interpretation.    [TP]   1025 The patient may still have an occult rib fracture, or her pain may be due to simply a chest wall contusion or intercostal muscle strain.  She will be discharged with a prescription for Ultram.  Her left wrist sprain and left knee contusion are improving.  She is to follow-up with PCP in 2 weeks.    [TP]      ED Course User Index  [TP] Carlos Egan MD                                           MDM  Number of Diagnoses or Management Options  Contusion of left chest wall, initial encounter: established and worsening  Contusion of left knee, subsequent encounter: established and improving  Left wrist sprain, subsequent encounter: established and improving     Amount and/or Complexity of Data Reviewed  Tests in the radiology section of CPT®: ordered and reviewed  Independent visualization of images, tracings, or specimens: yes    Risk of Complications, Morbidity, and/or Mortality  Presenting problems: moderate  Diagnostic procedures: moderate  Management options: moderate    Patient Progress  Patient progress: stable      Final diagnoses:   Contusion of left chest wall, initial encounter   Left wrist sprain, subsequent encounter   Contusion of left knee, subsequent encounter           Labs Reviewed - No data to display  XR Ribs Left With PA Chest   ED Interpretation   1. No visible acute rib fracture.   2. Old healed left 5th rib fracture.   3. No active disease in the chest. No pneumothorax or pleural effusion.       This report was finalized on 6/18/2020 10:20 AM by Dr. Dylan Dugan MD.          Final Result   1. No visible acute rib fracture.   2. Old healed left 5th rib fracture.   3. No active disease in the chest. No pneumothorax or  pleural effusion.       This report was finalized on 6/18/2020 10:20 AM by Dr. Dylan Dugan MD.                 Medication List      New Prescriptions    traMADol 50 MG tablet  Commonly known as:  ULTRAM  Take 1 tablet by mouth Every 6 (Six) Hours As Needed for Moderate Pain  or   Severe Pain .               Carlos Egan MD  06/18/20 1051

## 2021-04-25 ENCOUNTER — HOSPITAL ENCOUNTER (EMERGENCY)
Facility: HOSPITAL | Age: 86
Discharge: HOME | End: 2021-04-25
Attending: EMERGENCY MEDICINE | Admitting: EMERGENCY MEDICINE

## 2021-04-25 ENCOUNTER — APPOINTMENT (OUTPATIENT)
Dept: CT IMAGING | Facility: HOSPITAL | Age: 86
End: 2021-04-25

## 2021-04-25 VITALS
OXYGEN SATURATION: 95 % | HEART RATE: 80 BPM | HEIGHT: 65 IN | BODY MASS INDEX: 19.66 KG/M2 | TEMPERATURE: 99.6 F | WEIGHT: 118 LBS | SYSTOLIC BLOOD PRESSURE: 147 MMHG | RESPIRATION RATE: 22 BRPM | DIASTOLIC BLOOD PRESSURE: 74 MMHG

## 2021-04-25 DIAGNOSIS — R93.5 ABNORMAL CT OF THE ABDOMEN: ICD-10-CM

## 2021-04-25 DIAGNOSIS — R10.13 EPIGASTRIC ABDOMINAL PAIN: Primary | ICD-10-CM

## 2021-04-25 LAB
ALBUMIN SERPL-MCNC: 4.4 G/DL (ref 3.5–5.2)
ALBUMIN/GLOB SERPL: 1.5 G/DL
ALP SERPL-CCNC: 100 U/L (ref 39–117)
ALT SERPL W P-5'-P-CCNC: 18 U/L (ref 1–33)
ANION GAP SERPL CALCULATED.3IONS-SCNC: 15.7 MMOL/L (ref 5–15)
AST SERPL-CCNC: 27 U/L (ref 1–32)
BACTERIA UR QL AUTO: ABNORMAL /HPF
BASOPHILS # BLD AUTO: 0.01 10*3/MM3 (ref 0–0.2)
BASOPHILS NFR BLD AUTO: 0.1 % (ref 0–1.5)
BILIRUB SERPL-MCNC: 1.5 MG/DL (ref 0–1.2)
BILIRUB UR QL STRIP: NEGATIVE
BUN SERPL-MCNC: 17 MG/DL (ref 8–23)
BUN/CREAT SERPL: 22.4 (ref 7–25)
CALCIUM SPEC-SCNC: 9.2 MG/DL (ref 8.6–10.5)
CHLORIDE SERPL-SCNC: 100 MMOL/L (ref 98–107)
CLARITY UR: CLEAR
CO2 SERPL-SCNC: 22.3 MMOL/L (ref 22–29)
COLOR UR: ABNORMAL
CREAT SERPL-MCNC: 0.76 MG/DL (ref 0.57–1)
DEPRECATED RDW RBC AUTO: 41.4 FL (ref 37–54)
EOSINOPHIL # BLD AUTO: 0.03 10*3/MM3 (ref 0–0.4)
EOSINOPHIL NFR BLD AUTO: 0.4 % (ref 0.3–6.2)
ERYTHROCYTE [DISTWIDTH] IN BLOOD BY AUTOMATED COUNT: 12.4 % (ref 12.3–15.4)
GFR SERPL CREATININE-BSD FRML MDRD: 72 ML/MIN/1.73
GLOBULIN UR ELPH-MCNC: 3 GM/DL
GLUCOSE SERPL-MCNC: 79 MG/DL (ref 65–99)
GLUCOSE UR STRIP-MCNC: NEGATIVE MG/DL
HCT VFR BLD AUTO: 45.8 % (ref 34–46.6)
HGB BLD-MCNC: 14.6 G/DL (ref 12–15.9)
HGB UR QL STRIP.AUTO: NEGATIVE
HOLD SPECIMEN: NORMAL
HOLD SPECIMEN: NORMAL
HYALINE CASTS UR QL AUTO: ABNORMAL /LPF
IMM GRANULOCYTES # BLD AUTO: 0.02 10*3/MM3 (ref 0–0.05)
IMM GRANULOCYTES NFR BLD AUTO: 0.3 % (ref 0–0.5)
KETONES UR QL STRIP: ABNORMAL
LEUKOCYTE ESTERASE UR QL STRIP.AUTO: ABNORMAL
LIPASE SERPL-CCNC: 38 U/L (ref 13–60)
LYMPHOCYTES # BLD AUTO: 1.07 10*3/MM3 (ref 0.7–3.1)
LYMPHOCYTES NFR BLD AUTO: 14.8 % (ref 19.6–45.3)
MCH RBC QN AUTO: 29 PG (ref 26.6–33)
MCHC RBC AUTO-ENTMCNC: 31.9 G/DL (ref 31.5–35.7)
MCV RBC AUTO: 91.1 FL (ref 79–97)
MONOCYTES # BLD AUTO: 0.44 10*3/MM3 (ref 0.1–0.9)
MONOCYTES NFR BLD AUTO: 6.1 % (ref 5–12)
NEUTROPHILS NFR BLD AUTO: 5.67 10*3/MM3 (ref 1.7–7)
NEUTROPHILS NFR BLD AUTO: 78.3 % (ref 42.7–76)
NITRITE UR QL STRIP: NEGATIVE
NRBC BLD AUTO-RTO: 0 /100 WBC (ref 0–0.2)
PH UR STRIP.AUTO: <=5 [PH] (ref 5–8)
PLATELET # BLD AUTO: 244 10*3/MM3 (ref 140–450)
PMV BLD AUTO: 10.9 FL (ref 6–12)
POTASSIUM SERPL-SCNC: 3.7 MMOL/L (ref 3.5–5.2)
PROT SERPL-MCNC: 7.4 G/DL (ref 6–8.5)
PROT UR QL STRIP: ABNORMAL
QT INTERVAL: 411 MS
RBC # BLD AUTO: 5.03 10*6/MM3 (ref 3.77–5.28)
RBC # UR: ABNORMAL /HPF
REF LAB TEST METHOD: ABNORMAL
SODIUM SERPL-SCNC: 138 MMOL/L (ref 136–145)
SP GR UR STRIP: 1.03 (ref 1–1.03)
SQUAMOUS #/AREA URNS HPF: ABNORMAL /HPF
TROPONIN T SERPL-MCNC: <0.01 NG/ML (ref 0–0.03)
UROBILINOGEN UR QL STRIP: ABNORMAL
WBC # BLD AUTO: 7.24 10*3/MM3 (ref 3.4–10.8)
WBC UR QL AUTO: ABNORMAL /HPF
WHOLE BLOOD HOLD SPECIMEN: NORMAL
WHOLE BLOOD HOLD SPECIMEN: NORMAL

## 2021-04-25 PROCEDURE — 96375 TX/PRO/DX INJ NEW DRUG ADDON: CPT

## 2021-04-25 PROCEDURE — 80053 COMPREHEN METABOLIC PANEL: CPT | Performed by: PHYSICIAN ASSISTANT

## 2021-04-25 PROCEDURE — 85025 COMPLETE CBC W/AUTO DIFF WBC: CPT | Performed by: PHYSICIAN ASSISTANT

## 2021-04-25 PROCEDURE — 81001 URINALYSIS AUTO W/SCOPE: CPT | Performed by: PHYSICIAN ASSISTANT

## 2021-04-25 PROCEDURE — 74177 CT ABD & PELVIS W/CONTRAST: CPT

## 2021-04-25 PROCEDURE — 84484 ASSAY OF TROPONIN QUANT: CPT | Performed by: PHYSICIAN ASSISTANT

## 2021-04-25 PROCEDURE — 83690 ASSAY OF LIPASE: CPT | Performed by: PHYSICIAN ASSISTANT

## 2021-04-25 PROCEDURE — 93005 ELECTROCARDIOGRAM TRACING: CPT | Performed by: PHYSICIAN ASSISTANT

## 2021-04-25 PROCEDURE — 25010000002 MORPHINE PER 10 MG: Performed by: EMERGENCY MEDICINE

## 2021-04-25 PROCEDURE — 96374 THER/PROPH/DIAG INJ IV PUSH: CPT

## 2021-04-25 PROCEDURE — 99285 EMERGENCY DEPT VISIT HI MDM: CPT

## 2021-04-25 PROCEDURE — 93010 ELECTROCARDIOGRAM REPORT: CPT | Performed by: INTERNAL MEDICINE

## 2021-04-25 PROCEDURE — 25010000002 ONDANSETRON PER 1 MG: Performed by: EMERGENCY MEDICINE

## 2021-04-25 PROCEDURE — 25010000002 IOPAMIDOL 61 % SOLUTION: Performed by: EMERGENCY MEDICINE

## 2021-04-25 RX ORDER — FAMOTIDINE 10 MG/ML
20 INJECTION, SOLUTION INTRAVENOUS ONCE
Status: COMPLETED | OUTPATIENT
Start: 2021-04-25 | End: 2021-04-25

## 2021-04-25 RX ORDER — FAMOTIDINE 20 MG/1
20 TABLET, FILM COATED ORAL 2 TIMES DAILY
Qty: 14 TABLET | Refills: 0 | Status: SHIPPED | OUTPATIENT
Start: 2021-04-25 | End: 2021-04-27

## 2021-04-25 RX ORDER — SUCRALFATE 1 G/1
1 TABLET ORAL 3 TIMES DAILY
COMMUNITY

## 2021-04-25 RX ORDER — ONDANSETRON 2 MG/ML
4 INJECTION INTRAMUSCULAR; INTRAVENOUS ONCE
Status: COMPLETED | OUTPATIENT
Start: 2021-04-25 | End: 2021-04-25

## 2021-04-25 RX ORDER — ALUMINA, MAGNESIA, AND SIMETHICONE 2400; 2400; 240 MG/30ML; MG/30ML; MG/30ML
15 SUSPENSION ORAL ONCE
Status: COMPLETED | OUTPATIENT
Start: 2021-04-25 | End: 2021-04-25

## 2021-04-25 RX ORDER — MORPHINE SULFATE 2 MG/ML
2 INJECTION, SOLUTION INTRAMUSCULAR; INTRAVENOUS ONCE
Status: COMPLETED | OUTPATIENT
Start: 2021-04-25 | End: 2021-04-25

## 2021-04-25 RX ORDER — LIDOCAINE HYDROCHLORIDE 20 MG/ML
15 SOLUTION OROPHARYNGEAL ONCE
Status: COMPLETED | OUTPATIENT
Start: 2021-04-25 | End: 2021-04-25

## 2021-04-25 RX ORDER — SODIUM CHLORIDE 0.9 % (FLUSH) 0.9 %
10 SYRINGE (ML) INJECTION AS NEEDED
Status: DISCONTINUED | OUTPATIENT
Start: 2021-04-25 | End: 2021-04-25 | Stop reason: HOSPADM

## 2021-04-25 RX ADMIN — MORPHINE SULFATE 2 MG: 2 INJECTION, SOLUTION INTRAMUSCULAR; INTRAVENOUS at 13:15

## 2021-04-25 RX ADMIN — SODIUM CHLORIDE 500 ML: 9 INJECTION, SOLUTION INTRAVENOUS at 14:22

## 2021-04-25 RX ADMIN — IOPAMIDOL 85 ML: 612 INJECTION, SOLUTION INTRAVENOUS at 13:42

## 2021-04-25 RX ADMIN — ONDANSETRON 4 MG: 2 INJECTION INTRAMUSCULAR; INTRAVENOUS at 13:14

## 2021-04-25 RX ADMIN — LIDOCAINE HYDROCHLORIDE 15 ML: 20 SOLUTION ORAL; TOPICAL at 15:17

## 2021-04-25 RX ADMIN — FAMOTIDINE 20 MG: 10 INJECTION INTRAVENOUS at 15:17

## 2021-04-25 RX ADMIN — MAGNESIUM HYDROXIDE,ALUMINUM HYDROXICE,SIMETHICONE 15 ML: 240; 2400; 2400 SUSPENSION ORAL at 15:17

## 2021-04-25 NOTE — ED NOTES
This RN wearing all appropriate PPE during patient encounter. Hand hygiene performed before and during entering room.       Shonna Rendon, RN  04/25/21 8411

## 2021-04-25 NOTE — ED PROVIDER NOTES
EMERGENCY DEPARTMENT ENCOUNTER    Room Number:  05/05  Date seen:  4/25/2021  Time seen: 11:52 EDT  PCP: Giovanni Jones MD  Historian: patient      HPI:  Chief Complaint: abdominal pain    A complete HPI/ROS/PMH/PSH/SH/FH are unobtainable due to: none    Context: Becky Arceo is a 86 y.o. female who presents to the ED for evaluation of 1 week history of epigastric abdominal pain that is initially constant, moderate in severity, onset gradually and nothing that she is found seem to make it worse or better.  It started on her flight home from visiting family at [place].  Seems unrelated to eating and activity, is not exertional or pleuritic.  She states it does occasionally radiate to her back.  She has had nausea without vomiting, constipated for the last 2 to 3 days no diarrhea.  She denies any dysuria or urinary frequency, has had some hematuria for the last 1 week.  Had it checked by her PCP on Thursday and was found to have microscopic hematuria and no infection.  She has a history of Yan's esophagus.  She has had a bladder surgery as well as a hysterectomy.  She denies alcohol use.  No history of pancreatitis.    PAST MEDICAL HISTORY  Active Ambulatory Problems     Diagnosis Date Noted   • Imbalance 08/31/2016   • Cerebrovascular accident (CVA) (CMS/formerly [facility]) 10/13/2016   • Yan's esophagus 03/05/2018   • Gastroesophageal reflux disease 03/05/2018   • Atherosclerosis of aorta (CMS/formerly [facility]) 09/06/2018   • Abnormal EKG 09/06/2018   • Precordial pain 09/06/2018   • Dyspnea on exertion 09/06/2018   • Transient cerebral ischemia 09/09/2018   • History of lacunar cerebrovascular accident 09/09/2018   • Left arm weakness 09/09/2018     Resolved Ambulatory Problems     Diagnosis Date Noted   • No Resolved Ambulatory Problems     Past Medical History:   Diagnosis Date   • Acute pain of left shoulder    • Anxiety    • Balance problem    • Fatigue    • GERD (gastroesophageal reflux disease)    • Health care maintenance     • History of EKG 08/12/2015   • Hyperlipidemia    • Hypothyroidism    • Stroke (CMS/HCC)    • Vegetarian          PAST SURGICAL HISTORY  Past Surgical History:   Procedure Laterality Date   • ANTERIOR AND PELVIC EXENTERATION     • BLADDER SURGERY     • BREAST BIOPSY     • BREAST SURGERY     • COLONOSCOPY N/A 8/31/2016    Procedure: COLONOSCOPY;  Surgeon: Arsalan Lee MD;  Location: [facility] ENDOSCOPY;  Service:    • ENDOSCOPY N/A 8/31/2016    Procedure: ESOPHAGOGASTRODUODENOSCOPY WITH BIOPSIES (COLD);  Surgeon: Arsalan Lee MD;  Location: [facility] ENDOSCOPY;  Service:    • HYSTERECTOMY     • TONSILLECTOMY           FAMILY HISTORY  Family History   Problem Relation Age of Onset   • Heart failure Mother    • Stroke Sister    • Hypertension Sister    • Rheumatic fever Brother          SOCIAL HISTORY  Social History     Socioeconomic History   • Marital status:      Spouse name: Not on file   • Number of children: Not on file   • Years of education: Not on file   • Highest education level: Not on file   Tobacco Use   • Smoking status: Never Smoker   • Smokeless tobacco: Never Used   • Tobacco comment: caffeine use   Substance and Sexual Activity   • Alcohol use: No   • Drug use: No   • Sexual activity: Defer         ALLERGIES  Doxycycline, Amoxicillin, Cephalexin, Penicillins, and Promethazine        REVIEW OF SYSTEMS  Review of Systems     All systems reviewed and negative except for those discussed in HPI.       PHYSICAL EXAM  ED Triage Vitals   Temp Heart Rate Resp BP SpO2   04/25/21 1058 04/25/21 1058 04/25/21 1058 04/25/21 1112 04/25/21 1058   99.6 °F (37.6 °C) 84 24 135/90 100 %      Temp src Heart Rate Source Patient Position BP Location FiO2 (%)   04/25/21 1058 04/25/21 1112 04/25/21 1112 04/25/21 1112 --   Tympanic Monitor Lying Right arm          GENERAL: not distressed  HENT: atraumatic  EYES: no scleral icterus  CV: regular rhythm, regular rate  RESPIRATORY: normal effort CTA B  ABDOMEN: soft,  mild tenderness in the epigastrium and right upper quadrant, abdomen is nondistended with appropriate bowel sounds, no guarding or rigidity, no CVA tenderness  MUSCULOSKELETAL: no deformity  NEURO: alert, oriented x3, moves all extremities, follows commands  SKIN: warm, dry    Vital signs and nursing notes reviewed.          LAB RESULTS  Recent Results (from the past 24 hour(s))   Light Blue Top    Collection Time: 04/25/21 11:32 AM   Result Value Ref Range    Extra Tube hold for add-on    Green Top (Gel)    Collection Time: 04/25/21 11:32 AM   Result Value Ref Range    Extra Tube Hold for add-ons.    Lavender Top    Collection Time: 04/25/21 11:32 AM   Result Value Ref Range    Extra Tube hold for add-on    Gold Top - SST    Collection Time: 04/25/21 11:32 AM   Result Value Ref Range    Extra Tube Hold for add-ons.    Comprehensive Metabolic Panel    Collection Time: 04/25/21 11:32 AM    Specimen: Blood   Result Value Ref Range    Glucose 79 65 - 99 mg/dL    BUN 17 8 - 23 mg/dL    Creatinine 0.76 0.57 - 1.00 mg/dL    Sodium 138 136 - 145 mmol/L    Potassium 3.7 3.5 - 5.2 mmol/L    Chloride 100 98 - 107 mmol/L    CO2 22.3 22.0 - 29.0 mmol/L    Calcium 9.2 8.6 - 10.5 mg/dL    Total Protein 7.4 6.0 - 8.5 g/dL    Albumin 4.40 3.50 - 5.20 g/dL    ALT (SGPT) 18 1 - 33 U/L    AST (SGOT) 27 1 - 32 U/L    Alkaline Phosphatase 100 39 - 117 U/L    Total Bilirubin 1.5 (H) 0.0 - 1.2 mg/dL    eGFR Non African Amer 72 >60 mL/min/1.73    Globulin 3.0 gm/dL    A/G Ratio 1.5 g/dL    BUN/Creatinine Ratio 22.4 7.0 - 25.0    Anion Gap 15.7 (H) 5.0 - 15.0 mmol/L   Lipase    Collection Time: 04/25/21 11:32 AM    Specimen: Blood   Result Value Ref Range    Lipase 38 13 - 60 U/L   Troponin    Collection Time: 04/25/21 11:32 AM    Specimen: Blood   Result Value Ref Range    Troponin T <0.010 0.000 - 0.030 ng/mL   CBC Auto Differential    Collection Time: 04/25/21 11:32 AM    Specimen: Blood   Result Value Ref Range    WBC 7.24 3.40 - 10.80  10*3/mm3    RBC 5.03 3.77 - 5.28 10*6/mm3    Hemoglobin 14.6 12.0 - 15.9 g/dL    Hematocrit 45.8 34.0 - 46.6 %    MCV 91.1 79.0 - 97.0 fL    MCH 29.0 26.6 - 33.0 pg    MCHC 31.9 31.5 - 35.7 g/dL    RDW 12.4 12.3 - 15.4 %    RDW-SD 41.4 37.0 - 54.0 fl    MPV 10.9 6.0 - 12.0 fL    Platelets 244 140 - 450 10*3/mm3    Neutrophil % 78.3 (H) 42.7 - 76.0 %    Lymphocyte % 14.8 (L) 19.6 - 45.3 %    Monocyte % 6.1 5.0 - 12.0 %    Eosinophil % 0.4 0.3 - 6.2 %    Basophil % 0.1 0.0 - 1.5 %    Immature Grans % 0.3 0.0 - 0.5 %    Neutrophils, Absolute 5.67 1.70 - 7.00 10*3/mm3    Lymphocytes, Absolute 1.07 0.70 - 3.10 10*3/mm3    Monocytes, Absolute 0.44 0.10 - 0.90 10*3/mm3    Eosinophils, Absolute 0.03 0.00 - 0.40 10*3/mm3    Basophils, Absolute 0.01 0.00 - 0.20 10*3/mm3    Immature Grans, Absolute 0.02 0.00 - 0.05 10*3/mm3    nRBC 0.0 0.0 - 0.2 /100 WBC   ECG 12 Lead    Collection Time: 04/25/21 12:20 PM   Result Value Ref Range    QT Interval 411 ms   Urinalysis With Microscopic If Indicated (No Culture) - Urine, Clean Catch    Collection Time: 04/25/21  1:10 PM    Specimen: Urine, Clean Catch   Result Value Ref Range    Color, UA Dark Yellow (A) Yellow, Straw    Appearance, UA Clear Clear    pH, UA <=5.0 5.0 - 8.0    Specific Gravity, UA 1.026 1.005 - 1.030    Glucose, UA Negative Negative    Ketones, UA 80 mg/dL (3+) (A) Negative    Bilirubin, UA Negative Negative    Blood, UA Negative Negative    Protein, UA Trace (A) Negative    Leuk Esterase, UA Trace (A) Negative    Nitrite, UA Negative Negative    Urobilinogen, UA 1.0 E.U./dL 0.2 - 1.0 E.U./dL   Urinalysis, Microscopic Only - Urine, Clean Catch    Collection Time: 04/25/21  1:10 PM    Specimen: Urine, Clean Catch   Result Value Ref Range    RBC, UA None Seen None Seen, 0-2 /HPF    WBC, UA 3-5 (A) None Seen, 0-2 /HPF    Bacteria, UA None Seen None Seen /HPF    Squamous Epithelial Cells, UA 0-2 None Seen, 0-2 /HPF    Hyaline Casts, UA 3-6 None Seen /LPF    Methodology  Manual Light Microscopy        Ordered the above labs and independently reviewed the results.        RADIOLOGY  CT Abdomen Pelvis With Contrast   Final Result           1. No focal acute inflammatory process of bowel is identified, follow-up   as indications persist. Evidence of constipation.   2. Indeterminate liver lesions, as described. Subcentimeter pancreatic   low densities, interval follow-up suggested. Nonspecific prominence of   caliber of the main pancreatic duct at the pancreatic head.   3. Nonobstructive left nephrolithiasis. No obstructive uropathy.       This report was finalized on 4/25/2021 2:20 PM by Dr. Jad Ivy M.D.              I ordered the above noted radiological studies. Reviewed by me and discussed with radiologist.  See dictation for official radiology interpretation.    PROCEDURES  Procedures        MEDICATIONS GIVEN IN ER  Medications   sodium chloride 0.9 % flush 10 mL (has no administration in time range)   morphine injection 2 mg (2 mg Intravenous Given 4/25/21 1315)   ondansetron (ZOFRAN) injection 4 mg (4 mg Intravenous Given 4/25/21 1314)   iopamidol (ISOVUE-300) 61 % injection 100 mL (85 mL Intravenous Given 4/25/21 1342)   sodium chloride 0.9 % bolus 500 mL (0 mL Intravenous Stopped 4/25/21 1459)   famotidine (PEPCID) injection 20 mg (20 mg Intravenous Given 4/25/21 1517)   aluminum-magnesium hydroxide-simethicone (MAALOX MAX) 400-400-40 MG/5ML suspension 15 mL (15 mL Oral Given 4/25/21 1517)   Lidocaine Viscous HCl (XYLOCAINE) 2 % mouth solution 15 mL (15 mL Mouth/Throat Given 4/25/21 1517)             PROGRESS AND CONSULTS    Differential diagnosis includes but is not limited to:  - hepatobiliary pathology such as cholecystitis, cholangitis, and symptomatic cholelithiasis  - Pancreatitis  - Dyspepsia  - Small bowel obstruction  - Appendicitis  - Diverticulitis  - UTI including pyelonephritis  - Ureteral stone  - Zoster  - Colitis, including infectious and ischemic  -  Atypical ACS      ED Course as of Apr 25 1559   Sun Apr 25, 2021   1213 At initial evaluation the patient declines anything for pain.  She states it is moderate and tolerable at this time and would like to avoid medications if possible.  She will let us know if that changes.    [KA]   1302 Patient would now like something for pain, morphine and Zofran ordered.    [KA]   1357 WBC: 7.24 [KA]   1357 Hemoglobin: 14.6 [KA]   1358 Lipase: 38 [KA]   1358 Glucose: 79 [KA]   1358 Creatinine: 0.76 [KA]   1358 Troponin T: <0.010 [KA]      ED Course User Index  [KA] Kellen Samayoa PA     Was counseled the patient about her test results.  CBC CMP troponin and lipase all unremarkable.  EKG also unremarkable.  CT scan of the abdomen and pelvis shows some incidental findings in the liver and pancreas which we have made her aware of, no acute intra-abdominal findings.  I recommended follow-up with her PCP and gastroenterology for her symptoms and abnormal CT.  I suspect she is having some GERD and gastritis as the source of her symptoms.  She has a history of Yan's esophagus, apparently was prescribed Carafate by her PCP but only took it briefly because she felt that it did not help.  We have encouraged her to continue the Carafate as prescribed, have also prescribed her Pepcid and given her information for GI follow-up.  She is agreeable with this plan and stable for discharge.     Reviewed pt's history and workup with Dr. Sethi.  After a bedside evaluation; they agree with the plan of care      Patient was placed in face mask in first look. Patient was wearing facemask each time I entered the room and throughout our encounter. I wore protective equipment throughout this patient encounter including a face mask, eye shield and gloves. Hand hygiene was performed before donning protective equipment and after removal when leaving the room.        DIAGNOSIS  Final diagnoses:   Epigastric abdominal pain   Abnormal CT of the abdomen          Follow Up:  Giovanni Jones MD  [address] [facility]  [address]  [facility] [address] [phone]    In 2 days      Zayra Srinivasan MD  [address]  [place] [id number] [phone]    Schedule an appointment as soon as possible for a visit in 1 week        RX:     Medication List      New Prescriptions    famotidine 20 MG tablet  Commonly known as: PEPCID  Take 1 tablet by mouth 2 (Two) Times a Day.           Where to Get Your Medications      You can get these medications from any pharmacy    Bring a paper prescription for each of these medications  · famotidine 20 MG tablet           Latest Documented Vital Signs:  As of 15:59 EDT  BP- 147/74 HR- 73 Temp- 99.6 °F (37.6 °C) (Tympanic) O2 sat- 95%       Kellen Samayoa PA  04/25/21 0792

## 2021-04-25 NOTE — ED NOTES
Pt to ER from home c/o upper abd pain since Monday, worsening this morning.    This RN is wearing mask, gloves and goggles at all times during all patient interactions.     Michael Gabriel, RN  04/25/21 2669

## 2021-04-25 NOTE — DISCHARGE INSTRUCTIONS
Continue the Carafate as you have already been prescribed by your primary care doctor.  Take it regularly.  Take the Pepcid twice daily as prescribed as well.  This is also available over-the-counter.  Follow-up with the GI doctor listed about it for one of your choice for further evaluation of your symptoms as well as your abnormal CT scan which did show abnormalities in your pancreas and liver that need follow-up.  Return to the emergency department for increasing pain, fever, persistent vomiting, any concerns.

## 2021-04-25 NOTE — ED NOTES
This RN wearing all appropriate PPE during patient encounter. Hand hygiene performed before and during entering room.       Shonna Rendon, KATIA  04/25/21 4807

## 2021-04-25 NOTE — ED NOTES
This RN wearing all appropriate PPE during patient encounter. Hand hygiene performed before and during entering room.       Shonna Rendon, KATIA  04/25/21 9213

## 2021-04-26 NOTE — ED PROVIDER NOTES
The RADHA and I have discussed this patient's history, physical exam, and treatment plan. I have reviewed the documentation and personally had a face to face interaction with the patient  I affirm the documentation and agree with the treatment and plan.  The following describes my personal findings.    The patient presents complaining of epigastric abdominal pain for years, intermittent which patient reports has recurred this week and has been persistent for a week with poor appetite, worse with eating.  Patient reports nausea without vomiting, and reports poor p.o. intake for the past week.  Patient reports a history of Yan's esophagus.    Limited physical exam:  Patient is nontoxic appearing alert, oriented  Lungs/cardiovascular: Good air movement bilaterally, nontachypneic, nontachycardic, no rales  Abdomen: Soft, mild epigastric tender to palpation without guarding or rebound, negative Morales sign, positive bowel sounds  Back/extremities: Dorsalis pedis pulses palpable bilateral lower extremities without significant edema    EKG          EKG time: 1220  Rhythm/Rate: Sinus rhythm, rate in 70s  P waves and CT: Occasional PACs  QRS, axis: Poor R wave progression  ST and T waves: Nonspecific ST/T wave findings diffuse    Interpreted Contemporaneously by me, independently viewed  Mildly changed compared to prior 9/9/2018, nonspecific ST findings compared with previous    Patient and  voiced understanding of need to follow closely with PCP and GI specialist of their choice for recheck, further testing, treatment as needed as well as follow-up of abnormalities that are incidental noted on CT imaging today due to risk of abnormal growth of cell such as cancer which could be life-threatening or disabling.  Patient voices understanding of my recommendation to continue sucralfate as previously directed by Dr. Noonan pending further evaluation, follow-up and treatment.    Patient was wearing facemask when I entered  the room and throughout our encounter. Full protective equipment was worn throughout this patient encounter including a face mask, eye protection and gloves. Hand hygiene was performed before donning protective equipment and after removal when leaving the room.           Kristi Sethi MD  04/25/21 2013

## 2021-04-27 ENCOUNTER — OFFICE VISIT (OUTPATIENT)
Dept: GASTROENTEROLOGY | Facility: CLINIC | Age: 86
End: 2021-04-27

## 2021-04-27 VITALS — BODY MASS INDEX: 20.19 KG/M2 | WEIGHT: 121.2 LBS | HEIGHT: 65 IN

## 2021-04-27 DIAGNOSIS — R93.2 ABNORMAL CT SCAN, LIVER: ICD-10-CM

## 2021-04-27 DIAGNOSIS — K22.70 BARRETT'S ESOPHAGUS WITHOUT DYSPLASIA: Primary | ICD-10-CM

## 2021-04-27 DIAGNOSIS — R10.13 EPIGASTRIC PAIN: ICD-10-CM

## 2021-04-27 PROCEDURE — 99204 OFFICE O/P NEW MOD 45 MIN: CPT | Performed by: INTERNAL MEDICINE

## 2021-04-27 RX ORDER — OMEPRAZOLE 20 MG/1
20 TABLET, DELAYED RELEASE ORAL DAILY
Qty: 84 TABLET | Refills: 3 | Status: SHIPPED | OUTPATIENT
Start: 2021-04-27 | End: 2022-11-10

## 2021-04-27 RX ORDER — MULTIPLE VITAMINS W/ MINERALS TAB 9MG-400MCG
1 TAB ORAL DAILY
COMMUNITY
End: 2022-11-10

## 2021-04-27 NOTE — PROGRESS NOTES
Chief Complaint   Patient presents with   • ED follow up-ABD pain-ABN CT ABD     Subjective   HPI  Becky Arceo is a 86 y.o. female who presents today for new patient evaluation      This patient was recently seen in the emergency room for some vague upper abdominal pain.  Pain located along her xiphoid process radiating under her left rib and around to her back.  Pain not associated with food intake.  In the emergency room she underwent a CT scan of the abdomen and pelvis with findings noted below.  She was started on Carafate and has had some improvement in her symptoms over the last 24 hours.  She has no prior history of liver or pancreatic disorders.  Does have a history of Yan's esophagus.  Looks like her index endoscopy was in 2016 with Dr. Ny.  I reviewed that report there was short segment Yan's esophagus without dysplasia on pathology.  There is also a small nodule in the gastric antrum which to me had the appearance of a small pancreatic rest.  Since that time the patient has been followed by Dr. Addis Henao last had EGD and colonoscopy in October 2020 which she recalls as being relatively unremarkable with exception of her Yan's.    IMPRESSION:     1. No focal acute inflammatory process of bowel is identified, follow-up  as indications persist. Evidence of constipation.  2. Indeterminate liver lesions, as described. Subcentimeter pancreatic  low densities, interval follow-up suggested. Nonspecific prominence of  caliber of the main pancreatic duct at the pancreatic head.  3. Nonobstructive left nephrolithiasis. No obstructive uropathy.      Objective   There were no vitals filed for this visit.  Physical Exam  Vitals reviewed.   Constitutional:       Appearance: She is well-developed.   Cardiovascular:      Rate and Rhythm: Normal rate and regular rhythm.      Heart sounds: Normal heart sounds.   Pulmonary:      Effort: Pulmonary effort is normal. No respiratory distress.       Breath sounds: Normal breath sounds. No wheezing.   Abdominal:      General: Bowel sounds are normal.      Palpations: Abdomen is soft.   Musculoskeletal:      Cervical back: Normal range of motion and neck supple.   Skin:     General: Skin is warm and dry.   Neurological:      Mental Status: She is alert and oriented to person, place, and time.   Psychiatric:         Mood and Affect: Mood and affect normal.         Behavior: Behavior normal.         Thought Content: Thought content normal.         Cognition and Memory: Memory normal.         Judgment: Judgment normal.              Assessment/Plan   Assessment:     1. Yan's esophagus without dysplasia    2. Epigastric pain    3. Abnormal CT scan, liver      Plan:   Will schedule for MRI MRCP to further evaluate the nonspecific findings noted in the liver and pancreas on a recent CT scan.  I suspect these are probably incidental.  I will request records from prior EGD and colonoscopy from her general surgeon in [place].  I have switched her famotidine over to omeprazole given her history of Yan's she will continue with Carafate for now.  We need to consider updating her EGD after her MRCP is been performed and reviewed          Mau Alejandra M.D.  [facility]  [address] [place] [address]  Office: [phone]

## 2021-05-28 ENCOUNTER — HOSPITAL ENCOUNTER (OUTPATIENT)
Dept: MRI IMAGING | Facility: HOSPITAL | Age: 86
Discharge: HOME | End: 2021-05-28
Admitting: INTERNAL MEDICINE

## 2021-05-28 DIAGNOSIS — R93.2 ABNORMAL CT SCAN, LIVER: ICD-10-CM

## 2021-05-28 LAB — CREAT BLDA-MCNC: 0.6 MG/DL (ref 0.6–1.3)

## 2021-05-28 PROCEDURE — A9577 INJ MULTIHANCE: HCPCS | Performed by: INTERNAL MEDICINE

## 2021-05-28 PROCEDURE — 74183 MRI ABD W/O CNTR FLWD CNTR: CPT

## 2021-05-28 PROCEDURE — 82565 ASSAY OF CREATININE: CPT

## 2021-05-28 PROCEDURE — 0 GADOBENATE DIMEGLUMINE 529 MG/ML SOLUTION: Performed by: INTERNAL MEDICINE

## 2021-05-28 RX ADMIN — GADOBENATE DIMEGLUMINE 11 ML: 529 INJECTION, SOLUTION INTRAVENOUS at 15:22

## 2021-06-02 ENCOUNTER — TELEPHONE (OUTPATIENT)
Dept: GASTROENTEROLOGY | Facility: CLINIC | Age: 86
End: 2021-06-02

## 2021-06-02 DIAGNOSIS — K86.89 PANCREATIC DUCT DILATED: Primary | ICD-10-CM

## 2021-06-02 NOTE — TELEPHONE ENCOUNTER
----- Message from Luke Bran sent at 6/2/2021 10:04 AM EDT -----  Regarding: results  Contact: [phone]  Please call to advise of MRI results.

## 2021-06-03 NOTE — TELEPHONE ENCOUNTER
Benign liver hemangiomas  Minimal dilation of pancreatic duct, a few small cysts, radiologist recommended surveillance MRI in about 6 mos.  Office visit in 3 mos to discuss.  Thanks

## 2021-06-04 ENCOUNTER — PREP FOR SURGERY (OUTPATIENT)
Dept: OTHER | Facility: HOSPITAL | Age: 86
End: 2021-06-04

## 2021-06-04 NOTE — TELEPHONE ENCOUNTER
Patient called. Advised as per Dr. Zeng's note. She verb understanding. F/u office visit with Dr. Alejandra on 9/7@1400.   Order for MRI placed.

## 2021-09-07 ENCOUNTER — OFFICE VISIT (OUTPATIENT)
Dept: GASTROENTEROLOGY | Facility: CLINIC | Age: 86
End: 2021-09-07

## 2021-09-07 VITALS
HEIGHT: 65 IN | WEIGHT: 123 LBS | DIASTOLIC BLOOD PRESSURE: 72 MMHG | BODY MASS INDEX: 20.49 KG/M2 | SYSTOLIC BLOOD PRESSURE: 135 MMHG

## 2021-09-07 DIAGNOSIS — K22.70 BARRETT'S ESOPHAGUS WITHOUT DYSPLASIA: Primary | ICD-10-CM

## 2021-09-07 DIAGNOSIS — K86.2 PANCREATIC CYST: ICD-10-CM

## 2021-09-07 DIAGNOSIS — K21.9 GASTROESOPHAGEAL REFLUX DISEASE, UNSPECIFIED WHETHER ESOPHAGITIS PRESENT: ICD-10-CM

## 2021-09-07 PROCEDURE — 99214 OFFICE O/P EST MOD 30 MIN: CPT | Performed by: INTERNAL MEDICINE

## 2021-09-07 NOTE — PROGRESS NOTES
Chief Complaint   Patient presents with   • Liver Lesion     Subjective     HPI  Becky Arceo is a 86 y.o. female who presents today for office follow up.   Hx of cystic lesions of pancreas followed radiographically.  Last MRI from 5/2021 noted below, overall stable findings.  She has two benign hemangiomas.    Hx of Barretts esophagus repots occasional breakthrough GERD but tells me she is only taking her omeprazole periodically for unclear reasons.  Weight stable, actually up a little since last visit.    IMPRESSION:  1. The 2.7 cm and 2.3 cm liver lesions have signal and enhancement  characteristics consistent with hemangiomas. The 9 mm enhancing focus at  the right hepatic lobe likely represents a hemangioma as well and there  is also a subcentimeter hepatic cyst.  2. The main pancreatic duct has a diameter ranging from 2 to 4 mm and is  most prominent in the preampullary portion. The multiple cystic foci  present are likely ectatic sidebranches. One of the largest cystic  lesions at the pancreatic body measures 1 cm and may represent an  ectatic side branch as well. The appearance can be seen with an  intraductal papillary mucinous neoplasm. Conservative surveillance is  recommended with a follow-up contrast-enhanced CT of the abdomen in 6  months.    Objective   Vitals:    09/07/21 1352   BP: 135/72       Physical Exam  Vitals reviewed.   Constitutional:       Appearance: She is well-developed.   HENT:      Head: Normocephalic and atraumatic.   Abdominal:      General: Bowel sounds are normal. There is no distension.      Palpations: Abdomen is soft. There is no mass.      Tenderness: There is no abdominal tenderness.      Hernia: No hernia is present.   Skin:     General: Skin is warm and dry.   Neurological:      Mental Status: She is alert and oriented to person, place, and time.   Psychiatric:         Behavior: Behavior normal.         Thought Content: Thought content normal.         Judgment: Judgment  normal.                Assessment/Plan   Assessment:     1. Yan's esophagus without dysplasia    2. Gastroesophageal reflux disease, unspecified whether esophagitis present    3. Pancreatic cyst      Plan:   F/U MRCP scheduled for November for surveillance of pancreatic cyst. If stable we will need to discuss if/when to discontinue surveillance given her age.  Continue omeprazole for her GERD in setting of hx of Barretts.  Discussed importance of taking this medication on daily basis.  Request records from prior EGD with Dr Lis Alejandra M.D.  [facility]  [address] [place] [address]  Office: [phone]

## 2021-09-21 NOTE — PROGRESS NOTES
Medical records received and forwarded to Catalina MERLOS for scanning.  Thanks  [facility]

## 2021-09-30 ENCOUNTER — TELEPHONE (OUTPATIENT)
Dept: GASTROENTEROLOGY | Facility: CLINIC | Age: 86
End: 2021-09-30

## 2021-09-30 NOTE — PROGRESS NOTES
Dr Alejandra,    We have received the colonoscopy report along with the EGD path report. Gave to Catalina MERLOS to scan.    Thanks  Cissy

## 2021-10-01 NOTE — TELEPHONE ENCOUNTER
Mau Alejandra MD Stowers, Sharon G, RN  Caller: Unspecified (Yesterday,  2:27 PM)  Thanks   Please let patient know I was able to review results, she did NOT have Barretts on her last EGD, the path was negative.  Office f/u as scheduled     Called both numbers, phone continues to ring.

## 2021-11-05 ENCOUNTER — APPOINTMENT (OUTPATIENT)
Dept: GENERAL RADIOLOGY | Facility: HOSPITAL | Age: 86
End: 2021-11-05

## 2021-11-05 ENCOUNTER — HOSPITAL ENCOUNTER (EMERGENCY)
Facility: HOSPITAL | Age: 86
Discharge: HOME | End: 2021-11-05
Attending: EMERGENCY MEDICINE | Admitting: EMERGENCY MEDICINE

## 2021-11-05 VITALS
TEMPERATURE: 97.6 F | OXYGEN SATURATION: 98 % | DIASTOLIC BLOOD PRESSURE: 78 MMHG | HEART RATE: 57 BPM | SYSTOLIC BLOOD PRESSURE: 123 MMHG | RESPIRATION RATE: 16 BRPM

## 2021-11-05 DIAGNOSIS — R07.9 CHEST PAIN, UNSPECIFIED TYPE: Primary | ICD-10-CM

## 2021-11-05 LAB
ALBUMIN SERPL-MCNC: 4.2 G/DL (ref 3.5–5.2)
ALBUMIN/GLOB SERPL: 1.4 G/DL
ALP SERPL-CCNC: 98 U/L (ref 39–117)
ALT SERPL W P-5'-P-CCNC: 12 U/L (ref 1–33)
ANION GAP SERPL CALCULATED.3IONS-SCNC: 11.6 MMOL/L (ref 5–15)
AST SERPL-CCNC: 22 U/L (ref 1–32)
BASOPHILS # BLD AUTO: 0.02 10*3/MM3 (ref 0–0.2)
BASOPHILS NFR BLD AUTO: 0.4 % (ref 0–1.5)
BILIRUB SERPL-MCNC: 0.7 MG/DL (ref 0–1.2)
BUN SERPL-MCNC: 18 MG/DL (ref 8–23)
BUN/CREAT SERPL: 26.5 (ref 7–25)
CALCIUM SPEC-SCNC: 9.3 MG/DL (ref 8.6–10.5)
CHLORIDE SERPL-SCNC: 104 MMOL/L (ref 98–107)
CO2 SERPL-SCNC: 25.4 MMOL/L (ref 22–29)
CREAT SERPL-MCNC: 0.68 MG/DL (ref 0.57–1)
DEPRECATED RDW RBC AUTO: 37 FL (ref 37–54)
EOSINOPHIL # BLD AUTO: 0.1 10*3/MM3 (ref 0–0.4)
EOSINOPHIL NFR BLD AUTO: 1.9 % (ref 0.3–6.2)
ERYTHROCYTE [DISTWIDTH] IN BLOOD BY AUTOMATED COUNT: 12.1 % (ref 12.3–15.4)
GFR SERPL CREATININE-BSD FRML MDRD: 82 ML/MIN/1.73
GLOBULIN UR ELPH-MCNC: 2.9 GM/DL
GLUCOSE SERPL-MCNC: 93 MG/DL (ref 65–99)
HCT VFR BLD AUTO: 37.8 % (ref 34–46.6)
HGB BLD-MCNC: 12.9 G/DL (ref 12–15.9)
IMM GRANULOCYTES # BLD AUTO: 0.02 10*3/MM3 (ref 0–0.05)
IMM GRANULOCYTES NFR BLD AUTO: 0.4 % (ref 0–0.5)
LYMPHOCYTES # BLD AUTO: 1.32 10*3/MM3 (ref 0.7–3.1)
LYMPHOCYTES NFR BLD AUTO: 25 % (ref 19.6–45.3)
MCH RBC QN AUTO: 29.2 PG (ref 26.6–33)
MCHC RBC AUTO-ENTMCNC: 34.1 G/DL (ref 31.5–35.7)
MCV RBC AUTO: 85.5 FL (ref 79–97)
MONOCYTES # BLD AUTO: 0.47 10*3/MM3 (ref 0.1–0.9)
MONOCYTES NFR BLD AUTO: 8.9 % (ref 5–12)
NEUTROPHILS NFR BLD AUTO: 3.34 10*3/MM3 (ref 1.7–7)
NEUTROPHILS NFR BLD AUTO: 63.4 % (ref 42.7–76)
NRBC BLD AUTO-RTO: 0 /100 WBC (ref 0–0.2)
NT-PROBNP SERPL-MCNC: 95.8 PG/ML (ref 0–1800)
PLATELET # BLD AUTO: 233 10*3/MM3 (ref 140–450)
PMV BLD AUTO: 10 FL (ref 6–12)
POTASSIUM SERPL-SCNC: 3.9 MMOL/L (ref 3.5–5.2)
PROT SERPL-MCNC: 7.1 G/DL (ref 6–8.5)
QT INTERVAL: 472 MS
RBC # BLD AUTO: 4.42 10*6/MM3 (ref 3.77–5.28)
SODIUM SERPL-SCNC: 141 MMOL/L (ref 136–145)
TROPONIN T SERPL-MCNC: <0.01 NG/ML (ref 0–0.03)
WBC # BLD AUTO: 5.27 10*3/MM3 (ref 3.4–10.8)

## 2021-11-05 PROCEDURE — 84484 ASSAY OF TROPONIN QUANT: CPT | Performed by: NURSE PRACTITIONER

## 2021-11-05 PROCEDURE — 99283 EMERGENCY DEPT VISIT LOW MDM: CPT

## 2021-11-05 PROCEDURE — 83880 ASSAY OF NATRIURETIC PEPTIDE: CPT | Performed by: NURSE PRACTITIONER

## 2021-11-05 PROCEDURE — 80053 COMPREHEN METABOLIC PANEL: CPT | Performed by: NURSE PRACTITIONER

## 2021-11-05 PROCEDURE — 93010 ELECTROCARDIOGRAM REPORT: CPT | Performed by: INTERNAL MEDICINE

## 2021-11-05 PROCEDURE — 36415 COLL VENOUS BLD VENIPUNCTURE: CPT

## 2021-11-05 PROCEDURE — 85025 COMPLETE CBC W/AUTO DIFF WBC: CPT | Performed by: NURSE PRACTITIONER

## 2021-11-05 PROCEDURE — 71045 X-RAY EXAM CHEST 1 VIEW: CPT

## 2021-11-05 PROCEDURE — 93005 ELECTROCARDIOGRAM TRACING: CPT

## 2021-11-05 RX ORDER — LIDOCAINE HYDROCHLORIDE 20 MG/ML
15 SOLUTION OROPHARYNGEAL ONCE
Status: COMPLETED | OUTPATIENT
Start: 2021-11-05 | End: 2021-11-05

## 2021-11-05 RX ORDER — ALUMINA, MAGNESIA, AND SIMETHICONE 2400; 2400; 240 MG/30ML; MG/30ML; MG/30ML
15 SUSPENSION ORAL ONCE
Status: COMPLETED | OUTPATIENT
Start: 2021-11-05 | End: 2021-11-05

## 2021-11-05 RX ADMIN — MAGNESIUM HYDROXIDE,ALUMINUM HYDROXICE,SIMETHICONE 15 ML: 240; 2400; 2400 SUSPENSION ORAL at 15:13

## 2021-11-05 RX ADMIN — LIDOCAINE HYDROCHLORIDE 15 ML: 20 SOLUTION ORAL; TOPICAL at 15:13

## 2021-11-05 NOTE — ED PROVIDER NOTES
" EMERGENCY DEPARTMENT ENCOUNTER    Room Number:  12/12  Date of encounter:  11/5/2021  PCP: Giovanni Jones MD  Historian: patient   Full history not obtainable due to: none     HPI:  Chief Complaint: CP    Context: Becky Arceo is a 86 y.o. female who presents to the ED c/o CP onset 3 weeks ago but worsening x the last 3 days. Pain is mid sternal. Constant. Non radiating. Moderate severity. Nothing seems to make it worse or better. States she was \"woozy\" yesterday but denies any dizziness or syncope. + Nausea but denies vomiting. She endorses back pain but states it is chronic and unchanged. endorses cough but states it has been present for several years and is unchanged. No fever. No soa or diaphoresis.     Anticoagulated on Eliquis due to hx of CVA. Unsure if she's been diagnosed with atrial fibrillation in the past.   Followed by Dr Brand but she states she is looking for a new cardiologist as Dr Brand wants her to take a statin and she does not want to take that medication and as a result, they no longer get along.     Hx of HLD, CVA. No prior hx of MI.  No hx of DM    MEDICAL RECORD REVIEW:  7/18/2018  · Myocardial perfusion imaging indicates a small-sized infarct located in the apex with no significant ischemia noted.  · Left ventricular ejection fraction is normal (Calculated EF = 67%).  · Impressions are consistent with a low risk study.        PAST MEDICAL HISTORY    Active Ambulatory Problems     Diagnosis Date Noted   • Imbalance 08/31/2016   • Cerebrovascular accident (CVA) (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) 09/06/2018   • Abnormal EKG 09/06/2018   • Precordial pain 09/06/2018   • Dyspnea on exertion 09/06/2018   • Transient cerebral ischemia 09/09/2018   • History of lacunar cerebrovascular accident 09/09/2018   • Left arm weakness 09/09/2018     Resolved Ambulatory Problems     Diagnosis " Date Noted   • No Resolved Ambulatory Problems     Past Medical History:   Diagnosis Date   • Acute pain of left shoulder    • Anxiety    • Balance problem    • Fatigue    • GERD (gastroesophageal reflux disease)    • Health care maintenance    • History of EKG 08/12/2015   • Hyperlipidemia    • Hypothyroidism    • Stroke (CMS/HCC)    • Vegetarian          PAST SURGICAL HISTORY  Past Surgical History:   Procedure Laterality Date   • ANTERIOR AND PELVIC EXENTERATION     • BLADDER SURGERY     • BREAST BIOPSY     • BREAST SURGERY     • COLONOSCOPY N/A 8/31/2016    Procedure: COLONOSCOPY;  Surgeon: Arsalan Lee MD;  Location: [facility] ENDOSCOPY;  Service:    • ENDOSCOPY N/A 8/31/2016    Procedure: ESOPHAGOGASTRODUODENOSCOPY WITH BIOPSIES (COLD);  Surgeon: Arsalan Lee MD;  Location: [facility] ENDOSCOPY;  Service:    • HYSTERECTOMY     • TONSILLECTOMY           FAMILY HISTORY  Family History   Problem Relation Age of Onset   • Heart failure Mother    • Stroke Sister    • Hypertension Sister    • Rheumatic fever Brother          SOCIAL HISTORY  Social History     Socioeconomic History   • Marital status:    Tobacco Use   • Smoking status: Never Smoker   • Smokeless tobacco: Never Used   • Tobacco comment: caffeine use   Substance and Sexual Activity   • Alcohol use: No   • Drug use: No   • Sexual activity: Defer         ALLERGIES  Doxycycline, Amoxicillin, Cephalexin, Penicillins, and Promethazine        REVIEW OF SYSTEMS  Review of Systems   All systems reviewed and marked as negative except as listed in HPI       PHYSICAL EXAM    I have reviewed the triage vital signs and nursing notes.    ED Triage Vitals [11/05/21 1345]   Temp Heart Rate Resp BP SpO2   97.6 °F (36.4 °C) 74 16 90/58 98 %      Temp src Heart Rate Source Patient Position BP Location FiO2 (%)   -- -- -- -- --       GENERAL: Alert well developed, well nourished in no distress  HENT: NCAT, neck supple, trachea midline  EYES: no scleral icterus,  PERRL, normal conjunctivae  CV: regular rhythm, regular rate, no murmur  RESPIRATORY: unlabored effort, CTAB. Tenderness of the left chest wall.   ABDOMEN: soft, nontender, nondistended, bowel sounds present  MUSCULOSKELETAL: no gross deformity  NEURO: alert,  sensory and motor function of extremities grossly intact, speech clear, mental status normal/baseline  SKIN: warm, dry, no rash  PSYCH:  Appropriate mood and affect    Vital signs and nursing notes reviewed.          LAB RESULTS  Recent Results (from the past 24 hour(s))   Comprehensive Metabolic Panel    Collection Time: 11/05/21  2:16 PM    Specimen: Blood   Result Value Ref Range    Glucose 93 65 - 99 mg/dL    BUN 18 8 - 23 mg/dL    Creatinine 0.68 0.57 - 1.00 mg/dL    Sodium 141 136 - 145 mmol/L    Potassium 3.9 3.5 - 5.2 mmol/L    Chloride 104 98 - 107 mmol/L    CO2 25.4 22.0 - 29.0 mmol/L    Calcium 9.3 8.6 - 10.5 mg/dL    Total Protein 7.1 6.0 - 8.5 g/dL    Albumin 4.20 3.50 - 5.20 g/dL    ALT (SGPT) 12 1 - 33 U/L    AST (SGOT) 22 1 - 32 U/L    Alkaline Phosphatase 98 39 - 117 U/L    Total Bilirubin 0.7 0.0 - 1.2 mg/dL    eGFR Non African Amer 82 >60 mL/min/1.73    Globulin 2.9 gm/dL    A/G Ratio 1.4 g/dL    BUN/Creatinine Ratio 26.5 (H) 7.0 - 25.0    Anion Gap 11.6 5.0 - 15.0 mmol/L   Troponin    Collection Time: 11/05/21  2:16 PM    Specimen: Blood   Result Value Ref Range    Troponin T <0.010 0.000 - 0.030 ng/mL   BNP    Collection Time: 11/05/21  2:16 PM    Specimen: Blood   Result Value Ref Range    proBNP 95.8 0.0-1,800.0 pg/mL   CBC Auto Differential    Collection Time: 11/05/21  2:16 PM    Specimen: Blood   Result Value Ref Range    WBC 5.27 3.40 - 10.80 10*3/mm3    RBC 4.42 3.77 - 5.28 10*6/mm3    Hemoglobin 12.9 12.0 - 15.9 g/dL    Hematocrit 37.8 34.0 - 46.6 %    MCV 85.5 79.0 - 97.0 fL    MCH 29.2 26.6 - 33.0 pg    MCHC 34.1 31.5 - 35.7 g/dL    RDW 12.1 (L) 12.3 - 15.4 %    RDW-SD 37.0 37.0 - 54.0 fl    MPV 10.0 6.0 - 12.0 fL    Platelets  233 140 - 450 10*3/mm3    Neutrophil % 63.4 42.7 - 76.0 %    Lymphocyte % 25.0 19.6 - 45.3 %    Monocyte % 8.9 5.0 - 12.0 %    Eosinophil % 1.9 0.3 - 6.2 %    Basophil % 0.4 0.0 - 1.5 %    Immature Grans % 0.4 0.0 - 0.5 %    Neutrophils, Absolute 3.34 1.70 - 7.00 10*3/mm3    Lymphocytes, Absolute 1.32 0.70 - 3.10 10*3/mm3    Monocytes, Absolute 0.47 0.10 - 0.90 10*3/mm3    Eosinophils, Absolute 0.10 0.00 - 0.40 10*3/mm3    Basophils, Absolute 0.02 0.00 - 0.20 10*3/mm3    Immature Grans, Absolute 0.02 0.00 - 0.05 10*3/mm3    nRBC 0.0 0.0 - 0.2 /100 WBC   ECG 12 Lead    Collection Time: 11/05/21  2:37 PM   Result Value Ref Range    QT Interval 472 ms       Ordered the above labs and independently reviewed the results.        RADIOLOGY  XR Chest 1 View    Result Date: 11/5/2021  EMERGENCY PORTABLE VIEW OF THE CHEST 11/05/2021  CLINICAL HISTORY: Chest pain.  COMPARISON: This is correlated to prior CT angiogram of the chest on 07/24/2018. There are no prior chest x-rays for comparison.  FINDINGS: The cardiomediastinal silhouette is upper limits of normal. Pulmonary vasculature is within normal limits. The lungs are clear. Costophrenic angles are sharp.      No active disease is seen in the chest.  This report was finalized on 11/5/2021 3:52 PM by Dr. Harvinder Grey M.D.        I ordered the above noted radiological studies. Independently reviewed by me and discussed with radiologist.  See dictation above for official radiology interpretation.      PROCEDURES    Procedures        MEDICATIONS GIVEN IN ER    Medications   aluminum-magnesium hydroxide-simethicone (MAALOX MAX) 400-400-40 MG/5ML suspension 15 mL (15 mL Oral Given 11/5/21 1513)   Lidocaine Viscous HCl (XYLOCAINE) 2 % solution 15 mL (15 mL Mouth/Throat Given 11/5/21 1513)         PROGRESS, DATA ANALYSIS, CONSULTS, AND MEDICAL DECISION MAKING    All labs have been independently reviewed by me.  All radiology studies have been reviewed by me.   EKG's independently  reviewed by me.  Discussion below represents my analysis of pertinent findings related to patient's condition, differential diagnosis, treatment plan and final disposition.    DIFFERENTIAL DIAGNOSIS INCLUDE BUT NOT LIMITED TO: USA, ACS, aortic dissection, costochondritis, pericarditis, endocarditis, pneumonia, acute bronchitis, pneumothorax, PE, viral syndrome, pancreatitis, biliary colic, cholecystitis, GERD, muscle spasm, anxiety       ED Course as of 11/05/21 1753   Fri Nov 05, 2021   1439 WBC: 5.27 [JS]   1439 Hemoglobin: 12.9 [JS]   1530 I viewed xr chest on pacs. My findings are no cm. [JS]   1753 Troponin T: <0.010 [JS]   1753 proBNP: 95.8 [JS]      ED Course User Index  [JS] Valeria Nguyen APRN       MDM: Pt presents with chest pain x several weeks duration. Constant in nature and non radiating. Relieved mostly with GI cocktail. Not described as typical of cardiac etiology. Will plan to refer her to a new cardiologist for further evaluation of chest pain, as she is unhappy with her current cardiologist and she requested Mukesh Schmitt.     BP - 123/78  HR - 57  TEMP - 97.6 °F (36.4 °C)  O2 SATS - 98%      DIAGNOSIS  Final diagnoses:   Chest pain, unspecified type         DISPOSITION  Discharge     Pt masked in first look. I wore appropriate PPE throughout my encounters with the pt. I performed hand hygiene on entry into the pt room and upon exit.     Dictated utilizing Dragon dictation:  Much of this encounter note is an electronic transcription/translation of spoken language to printed text. The electronic translation of spoken language may permit erroneous, or at times, nonsensical words or phrases to be inadvertently transcribed; Although I have reviewed the note for such errors, some may still exist.     Valeria Nguyen APRN  11/05/21 1754

## 2021-11-05 NOTE — DISCHARGE INSTRUCTIONS
Home to rest   Follow up with Dr Schmitt  Drink plenty of fluids  Return if worse or new concerns   Continue care with your primary care physician and have your blood pressure regularly checked and managed. Normal blood pressure is 120/80.

## 2021-11-05 NOTE — ED PROVIDER NOTES
"Pt presents to the ED c/o  constant substernal chest pressure for 1 week.  Patient states the pain waxes and wanes but never goes completely away.  She states nothing makes it better or worse.  It is nonpleuritic in nature.  Eating does not make it better or worse.  She states she felt \"woozy\" yesterday and this lasted for 1 to 2 hours.  She did have associated nausea but no vomiting, diaphoresis or palpitations.  Patient states she does have a cardiologist, Dr. Nicole, but would like to switch to Dr. Schmitt.  She has a history of a CVA in the past and is on Eliquis.  She denies a family history of coronary artery disease.  She does admit to a history of Yan's esophagus and GERD.  She tried Tums last night without relief of her discomfort.     On exam,   Heart is regular rate and rhythm without murmurs.  Lungs are clear to auscultation bilaterally.  Her chest wall is nontender to palpation.  Her abdomen is NABS, soft, nontender nondistended.  Her legs reveal no edema or calf tenderness.     Plan: I agree with plan of checking heart enzymes and chest x-ray.    EKG          EKG time: 1437  Rhythm/Rate: Sinus arrhythmia, rate 57  P waves and OH: normal  QRS, axis: normal   ST and T waves: Nonspecific T wave changes    Interpreted Contemporaneously by me, independently viewed  unchanged compared to prior 04/25/21        Patient was placed in face mask in first look. Patient was wearing facemask when I entered the room and throughout our encounter. I wore full protective equipment throughout this patient encounter including a face mask, eye shield and gloves. Hand hygiene was performed before donning protective equipment and after removal when leaving the room.       Attestation:  The RADHA and I have discussed this patient's history, physical exam, and treatment plan.  I have reviewed the documentation and personally had a face to face interaction with the patient. I affirm the documentation and agree with the " treatment and plan.  The attached note describes my personal findings.            Conor Leon MD  11/05/21 0673

## 2021-11-05 NOTE — ED TRIAGE NOTES
Pt arrived via ems from [facility] with complaints of Chest pressure. Pt was given 324asa, 1 nitro and 4MG of zofran  In route.     Pt was wearing a mask during assessment.  This RN wore appropriate PPE

## 2021-11-17 ENCOUNTER — TRANSCRIBE ORDERS (OUTPATIENT)
Dept: ADMINISTRATIVE | Facility: HOSPITAL | Age: 86
End: 2021-11-17

## 2021-11-17 DIAGNOSIS — Z78.0 POST-MENOPAUSAL: Primary | ICD-10-CM

## 2021-11-17 DIAGNOSIS — Z12.31 VISIT FOR SCREENING MAMMOGRAM: Primary | ICD-10-CM

## 2021-12-07 ENCOUNTER — HOSPITAL ENCOUNTER (OUTPATIENT)
Dept: MRI IMAGING | Facility: HOSPITAL | Age: 86
Discharge: HOME | End: 2021-12-07
Admitting: INTERNAL MEDICINE

## 2021-12-07 DIAGNOSIS — K86.89 PANCREATIC DUCT DILATED: ICD-10-CM

## 2021-12-07 PROCEDURE — 0 GADOBENATE DIMEGLUMINE 529 MG/ML SOLUTION: Performed by: INTERNAL MEDICINE

## 2021-12-07 PROCEDURE — A9577 INJ MULTIHANCE: HCPCS | Performed by: INTERNAL MEDICINE

## 2021-12-07 PROCEDURE — 74183 MRI ABD W/O CNTR FLWD CNTR: CPT

## 2021-12-07 RX ADMIN — GADOBENATE DIMEGLUMINE 11 ML: 529 INJECTION, SOLUTION INTRAVENOUS at 14:36

## 2021-12-14 ENCOUNTER — TELEPHONE (OUTPATIENT)
Dept: GASTROENTEROLOGY | Facility: CLINIC | Age: 86
End: 2021-12-14

## 2021-12-14 NOTE — PROGRESS NOTES
Physical Medicine and Rehabilitation Progress Note:  Amputation:  05 5  Bilateral Lower Limb Above the Knee  John Pereira 63 y o  male MRN: [id number]  Unit/Bed#: -01 Encounter: [id number]    Assessment: Paresh Brooks is a 63 y o  male who presented to the [address] with diffuse abdominal pain, hematemesis  He was previously admitted for the same complaints and EGD revealed esophagitis  On this admission CT of the chest abdomen pelvis demonstrated pulmonary emphysema, no definite esophageal abnormalities  Repeat EGD was performed on 11/16 with demonstrated reflux esophagitis, gastritis, and mallorry-mantilla tear at GE junction  Of note patient has history of mitral valve replacement for which he was anticoagulated with coumadin (INR of 2 57)  He was initially on Protonix drip which was converted to PO form       Patient also has history of AKA, performed on 09/2017  He has recently received prosthesis, but has had no training as of yet  Patient accepted to [facility] for prosthesis, gait training on 11/20/17  Subjective: Patient seen and examined at bedside  This AM, no complaints  Later afternoon, however, patient noted to become extremely tearful after being informed daughter would be unable to take him back to her house  He was given list of shelters to choose from, but became very agitated after being informed he may not be able to take his dog with him  He reported to nursing staff that he would put a rope around his neck if that happens  I went up to see patient, and he remained very tearful  He reports that he talked to his daughter on the phone and is distraught that she will not accept him back to her home, stating "I can't believe this is happening; why is God doing this to me"  He the reports he has a gun at home and will "use it"  I specifically asked if he wanted to use it on himself or others, and he states "I'll use it on myself"   He is adamant about not Stable findings on MRI.  Radiologist recommends continues surveillance with repeat MRCP in 6 mos, she can f/u in office in 6 mos and we can discuss whether that is appropriate.  Thanks. wanting to hurt others  I had a lengthy talk with Hernandez Tripathi after this, and let him know that functionally he has made significant gains despite the loss of his limb, to the point he has received a prosthesis  Patient eventually calmed, and I repeated my inquiry as to whether he has any wish to harm himself or others  Hernandez Tripathi states clearly that he does not wish to harm himself or others  He reports "I just want to get better and I want to come back to the hospital to help other people like me"  I let him know our neuropsychology service will be seeing him, but I would also request inpatient psychiatry to see him as well, to which he demonstrated understanding  ROS: A 10-point ROS was performed  Negative except as listed above  Restrictions include: Fall precautions    Disposition: TBD    Plan:  1  Current Medical Problems/Risks/Management:  # 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  - Acute comprehensive interdisciplinary inpatient rehabilitation including PT, OT, SLP, RN, CM, SW, dietary, psychology, etc   - Appreciate Internal Medicine following - Dr Joe Negro service   - S/p left AKA on 8/24/17 by Dr Ana María Meyers  - Continue prosthetic training by PT/OT  - continue gabapentin for phantom limb pain, titrate up as tolerated  - f/u vascular surgery as outpatient    # Suicidal idealization  - patient reported wanting to "use a rope" or "gun" if he was unable to go to shelter without his dog; distraught over daughter refusing to take him  - after discussion with patient, states he does not wish to hurt himself or others   Hold off on 1:1  - Psychiatry consultation placed  - Continue lorazepam PRN  - continue seroquel QHS     # GI bleed  - s/p EGD  - secondary to reflux esophagitis, gastritis, and mallorry-mantilla tear at GE junction  - IM is monitoring hemoglobin  - Continue protonix   - GI f/u as outpatient     # SEAN  - Continue monitoring kidney function  - encourage fluid intake       Results from last 7 days  Lab Units 11/21/17  1224 11/19/17  0624 11/17/17  0510   CREATININE mg/dL 1 19 1 11 0 84     # DM  - Continue ISS  - Continue finger sticks      Results from last 7 days  Lab Units 11/21/17  1224 11/19/17  0624 11/17/17  0510   GLUCOSE RANDOM mg/dL 173* 156* 147*     # HTN/CAD/s/p MV replacement/CHF  - Continue Atorvastatin  - Continue torsemide  - Continue Lisinopril  - Continue Metoprolol   - IM team dosing warfarin     Temp:  [97 7 °F (36 5 °C)-98 9 °F (37 2 °C)] 98 9 °F (37 2 °C)  HR:  [65-77] 77  Resp:  [18-20] 20  BP: (105-119)/(65-76) 105/76       Results from last 7 days  Lab Units 11/21/17  0513 11/19/17  0624 11/18/17  0524   INR  3 00* 2 70* 2 90*     # Hypokalemia  - supplement as needed       Results from last 7 days  Lab Units 11/21/17  1224 11/19/17  0624 11/17/17  0510   POTASSIUM mmol/L 4 1 3 4* 3 5     # Pain  - Continue tylenol PRN, for max of 3gm daily     - Continue Norco PRN   - Continue gabapentin      # Rehab Psych  - Neuropsych consult, appreciate recs     # FENA/prophy  - Diet: diabetic   SLP and nutrition to monitor and adjust as necessary   - DVT prophy: Sequential compression device (Venodyne)  and Warfarin (Coumadin)  - GI ppx: Pantaprazole  - Bowel: colace , dulcolax suppository  and miralax PRN  - Nausea: Zofran PRN  - Supplements: None  - Sleep: None      CODE: Level 1: Full Code    Objective:  Nursing staff reporting: see subjective    Functional Update:  Physical Therapy Occupational Therapy   Sit Pivot Modified Independent   Sit to Stand Contact Guard;Supervision   Stand to Sit Contact Guard;Supervision   Findings CS/CGA with sit to stand and standing    Bed, Chair, Wheelchair Transfer (FIM) 4 - Patient requires steadying assist or light touching     Walking (FIM) 1 - Patient ambulates less than 49 feet regardless of assist/device/set up     Wheelchair (FIM) 6 - Patient wheels 150 feet or more, no rests AND independently, timely and safely          UB Dressing (FIM) 6 - Patient requires assistive device/extra time/safety concerns but completes independently   LB Dressing (FIM) 6 - Patient requires assistive device/extra time/safety concerns but completes independently     Toileting (FIM) 6 - Patient requires assistive device/extra time/safety concerns but completes independently     Bed, Chair, Wheelchair Transfer (FIM) 6 - Patient requires assistive device/extra time/safety concerns but completes independently     Shower Transfer (FIM) 6 - Patient requires assistive device/extra time/safety concerns but completes independently        Allergies and Medications per EMR    Physical Exam:  General: alert, cooperative and comfortable  HENMT: Head: Normal, normocephalic, atraumatic  Eye: Normal external eye, conjunctiva, lids   Ears: Normal external ears  Nose: Normal external nose, mucus membranes  Pulmonary: chest expansion normal, no retractions, no accessory muscle usage  Abdomen: soft, nontender, nondistended, no masses or organomegaly  Skin/Extremity: no rashes, no erythema, no peripheral edema  Incisions: well healed incision  No erythema, no drainage  Neurologic: Awake alert orientedx3  Psych: distraught today, with SI endorsed  Tearful  However, was redirectable and able to be calmed       Diagnostic Studies: reviewed, no new imaging    Vitals:  Temp:  [97 7 °F (36 5 °C)-98 9 °F (37 2 °C)] 98 9 °F (37 2 °C)  HR:  [65-77] 77  Resp:  [18-20] 20  BP: (105-119)/(65-76) 105/76   Intake/Output Summary (Last 24 hours) at 11/22/17 1612  Last data filed at 11/22/17 1226   Gross per 24 hour   Intake              940 ml   Output                0 ml   Net              940 ml        Laboratory:    Lab Results   Component Value Date    HGB 13 7 11/21/2017    HCT 40 1 11/21/2017    WBC 9 54 11/21/2017     Lab Results   Component Value Date    BUN 28 (H) 11/21/2017     11/21/2017    K 4 1 11/21/2017     11/21/2017    GLUCOSE 173 (H) 11/21/2017    GLUCOSE 273 (H) 04/18/2017    CREATININE 1 19 11/21/2017     Lab Results   Component Value Date    PROTIME 31 6 (H) 11/21/2017    INR 3 00 (H) 11/21/2017        Patient Active Problem List   Diagnosis    Hypertension    B[facility] disorder ([facility] )    Diabetes mellitus ([place] [address] )    Hiatal hernia    Erosive esophagitis    CAD (coronary artery disease)    Pacemaker    Chronic systolic congestive heart failure ([place] [address] )    Peripheral artery disease (HCC)    History of mitral valve replacement with mechanical valve    Bipolar 1 disorder, depressed (HCC)    Bullous dermatosis    GI bleed    Acute upper GI bleed    Status post above knee amputation of left lower extremity (HCC)       ** Please Note: Fluency Direct voice to text software may have been used in the creation of this document  **    [ x ] Total time spent: 30 Mins, and greater than 50% of this time was spent counseling/coordinating care

## 2021-12-14 NOTE — TELEPHONE ENCOUNTER
----- Message from Mau Alejandra MD sent at 12/14/2021 11:36 AM EST -----  Stable findings on MRI.  Radiologist recommends continues surveillance with repeat MRCP in 6 mos, she can f/u in office in 6 mos and we can discuss whether that is appropriate.  Thanks.

## 2021-12-14 NOTE — TELEPHONE ENCOUNTER
Patient called. Advised as per Dr. Alejandra's note. She verb understanding and is in agreement with the plan. F/u visit with Dr Oliverarele: 05/03/22 @8671.

## 2022-05-03 ENCOUNTER — OFFICE VISIT (OUTPATIENT)
Dept: GASTROENTEROLOGY | Facility: CLINIC | Age: 87
End: 2022-05-03

## 2022-05-03 VITALS
WEIGHT: 125.2 LBS | SYSTOLIC BLOOD PRESSURE: 133 MMHG | HEIGHT: 65 IN | DIASTOLIC BLOOD PRESSURE: 83 MMHG | TEMPERATURE: 96.8 F | BODY MASS INDEX: 20.86 KG/M2

## 2022-05-03 DIAGNOSIS — K22.70 BARRETT'S ESOPHAGUS WITHOUT DYSPLASIA: ICD-10-CM

## 2022-05-03 DIAGNOSIS — K86.2 PANCREATIC CYST: Primary | ICD-10-CM

## 2022-05-03 PROCEDURE — 99214 OFFICE O/P EST MOD 30 MIN: CPT | Performed by: INTERNAL MEDICINE

## 2022-05-03 RX ORDER — SACCHAROMYCES BOULARDII 250 MG
250 CAPSULE ORAL 2 TIMES DAILY
COMMUNITY
End: 2022-11-10

## 2022-05-03 NOTE — PROGRESS NOTES
Chief Complaint   Patient presents with   • york's esophagus     Subjective     HPI  Becky Arceo is a 87 y.o. female who presents today for office follow up.  She has been followed for cystic lesions of the pancreas.  Her last MRI from December as noted below.  There was no significant change in the various small cystic lesions scattered throughout the pancreas.  There was no significant dilation of the main pancreatic duct.  She has no symptoms related to pancreatic disorder.    She also reports a prior history of York's esophagus although I was able to review her last EGD performed in 2020 there was no evidence of York's esophagus by histology.  She previously been on omeprazole but says she has not taken this due to potential side effects and has been buying an over-the-counter got buster which she says has mitigated any reflux symptoms that she may have ever had.  She has no dysphagia or unintentional weight loss.     IMPRESSION:  1.  Multiple cystic lesions scattered throughout the pancreas, grossly  unchanged since 05/28/2021; however it is difficult to determine if this  represents one long cystic lesion versus multiple dilated focal cysts.  No findings of pancreatic ductal dilation. Continued attention on  follow-up with MRI abdomen in 6 months is recommended to ensure  stability.  2.  Findings of hepatic hemangiomas, as before.  3.  Other findings as above    Objective   Vitals:    05/03/22 1324   BP: 133/83   Temp: 96.8 °F (36 °C)       Physical Exam  Vitals reviewed.   Constitutional:       Appearance: She is well-developed.   HENT:      Head: Normocephalic and atraumatic.   Neurological:      Mental Status: She is alert and oriented to person, place, and time.   Psychiatric:         Behavior: Behavior normal.         Thought Content: Thought content normal.         Judgment: Judgment normal.                Assessment/Plan   Assessment:     1. Pancreatic cyst    2. York's esophagus without  dysplasia      Plan:   MRI reviewed with the patient there is no significant change from prior MRI from early last year.  We discussed whether she would wish to continue with radiographic surveillance given her age and the unlikely chance that these lesions would progress to an overt neoplastic process in her lifetime.  We also discussed that it would be unlikely that we would recommend aggressive therapy for the cyst if there was evidence of some change given the morbidity associated with pancreatic surgery at her age.  She does tell me that she still wishes to continue with radiographic surveillance and is asked that we schedule a follow-up MRI as recommended by radiology in 6 months that will be due in June of this year.    As for Yan's esophagus she had no evidence of Yan's esophagus on her last EGD in 2020.  Given those findings I do not think at her age that further surveillance EGD is warranted.  She has no desire to take PPI therapy and will continue with over-the-counter remedies as needed.          Mau Alejandra M.D.  [facility], [facility]  [address] [place] [address]  Office: [phone]

## 2022-05-10 ENCOUNTER — HOSPITAL ENCOUNTER (OUTPATIENT)
Dept: BONE DENSITY | Facility: HOSPITAL | Age: 87
Discharge: HOME | End: 2022-05-10

## 2022-05-10 ENCOUNTER — HOSPITAL ENCOUNTER (OUTPATIENT)
Dept: MAMMOGRAPHY | Facility: HOSPITAL | Age: 87
Discharge: HOME | End: 2022-05-10

## 2022-05-10 DIAGNOSIS — Z78.0 POST-MENOPAUSAL: ICD-10-CM

## 2022-05-10 DIAGNOSIS — Z12.31 VISIT FOR SCREENING MAMMOGRAM: ICD-10-CM

## 2022-05-10 PROCEDURE — 77067 SCR MAMMO BI INCL CAD: CPT

## 2022-05-10 PROCEDURE — 77080 DXA BONE DENSITY AXIAL: CPT

## 2022-05-10 PROCEDURE — 77063 BREAST TOMOSYNTHESIS BI: CPT

## 2022-06-13 ENCOUNTER — HOSPITAL ENCOUNTER (OUTPATIENT)
Dept: MRI IMAGING | Facility: HOSPITAL | Age: 87
Discharge: HOME | End: 2022-06-13
Admitting: INTERNAL MEDICINE

## 2022-06-13 DIAGNOSIS — K86.2 PANCREATIC CYST: ICD-10-CM

## 2022-06-13 LAB — CREAT BLDA-MCNC: 0.6 MG/DL (ref 0.6–1.3)

## 2022-06-13 PROCEDURE — 82565 ASSAY OF CREATININE: CPT

## 2022-06-13 PROCEDURE — 74183 MRI ABD W/O CNTR FLWD CNTR: CPT

## 2022-06-13 PROCEDURE — 0 GADOBENATE DIMEGLUMINE 529 MG/ML SOLUTION: Performed by: INTERNAL MEDICINE

## 2022-06-13 PROCEDURE — A9577 INJ MULTIHANCE: HCPCS | Performed by: INTERNAL MEDICINE

## 2022-06-13 RX ADMIN — GADOBENATE DIMEGLUMINE 12 ML: 529 INJECTION, SOLUTION INTRAVENOUS at 13:28

## 2022-07-09 ENCOUNTER — TELEPHONE ENCOUNTER (OUTPATIENT)
Dept: URBAN - METROPOLITAN AREA CLINIC 121 | Facility: CLINIC | Age: 87
End: 2022-07-09

## 2022-07-09 RX ORDER — FAMOTIDINE 10 MG
TWICE A DAY TABLET ORAL TWICE A DAY
Refills: 3 | OUTPATIENT
Start: 2016-01-22 | End: 2016-01-22

## 2022-07-10 ENCOUNTER — TELEPHONE ENCOUNTER (OUTPATIENT)
Dept: URBAN - METROPOLITAN AREA CLINIC 121 | Facility: CLINIC | Age: 87
End: 2022-07-10

## 2022-07-10 RX ORDER — FAMOTIDINE 10 MG
TABLET ORAL TWICE A DAY
Refills: 0 | Status: ACTIVE | COMMUNITY
Start: 2016-01-22

## 2022-07-10 RX ORDER — ASPIRIN 81 MG/1
TABLET, COATED ORAL ONCE A DAY
Refills: 0 | Status: ACTIVE | COMMUNITY
Start: 2016-01-22

## 2022-07-10 RX ORDER — CETIRIZINE HYDROCHLORIDE 10 MG/1
TABLET, FILM COATED ORAL AS NEEDED
Refills: 0 | Status: ACTIVE | COMMUNITY
Start: 2016-01-22

## 2022-07-10 RX ORDER — CYCLOBENZAPRINE HYDROCHLORIDE 5 MG/1
TABLET, FILM COATED ORAL AS NEEDED
Refills: 0 | Status: ACTIVE | COMMUNITY
Start: 2016-01-22

## 2022-07-10 RX ORDER — CLOPIDOGREL BISULFATE 75 MG
TABLET ORAL ONCE A DAY
Refills: 0 | Status: ACTIVE | COMMUNITY
Start: 2016-01-22

## 2022-07-13 ENCOUNTER — TELEPHONE (OUTPATIENT)
Dept: GASTROENTEROLOGY | Facility: CLINIC | Age: 87
End: 2022-07-13

## 2022-07-13 NOTE — TELEPHONE ENCOUNTER
----- Message from Mau Alejandra MD sent at 7/6/2022 11:59 AM EDT -----  Stable cystic lesion of pancreas, no change from 5/2021  Radiologist recommends consideration for f/u in 6 mos.  Please arrange office f/u with me in 5 mos to discuss.

## 2022-07-13 NOTE — TELEPHONE ENCOUNTER
Patient notified of results and recommendations and verbalized understanding    Pt stated she didn't want to schedule a fu wants to find a GI closer to home.

## 2023-03-09 ENCOUNTER — TRANSCRIBE ORDERS (OUTPATIENT)
Dept: ADMINISTRATIVE | Facility: HOSPITAL | Age: 88
End: 2023-03-09
Payer: MEDICARE

## 2023-03-09 DIAGNOSIS — K86.9 PANCREATIC LESION: Primary | ICD-10-CM

## 2023-03-23 ENCOUNTER — TRANSCRIBE ORDERS (OUTPATIENT)
Dept: ADMINISTRATIVE | Facility: HOSPITAL | Age: 88
End: 2023-03-23
Payer: MEDICARE

## 2023-03-23 DIAGNOSIS — Z12.31 VISIT FOR SCREENING MAMMOGRAM: Primary | ICD-10-CM

## 2023-05-23 ENCOUNTER — HOSPITAL ENCOUNTER (OUTPATIENT)
Dept: MAMMOGRAPHY | Facility: HOSPITAL | Age: 88
Discharge: HOME | End: 2023-05-23
Admitting: FAMILY MEDICINE
Payer: MEDICARE

## 2023-05-23 DIAGNOSIS — Z12.31 VISIT FOR SCREENING MAMMOGRAM: ICD-10-CM

## 2023-05-23 PROCEDURE — 77063 BREAST TOMOSYNTHESIS BI: CPT

## 2023-05-23 PROCEDURE — 77067 SCR MAMMO BI INCL CAD: CPT

## 2024-02-06 ENCOUNTER — LAB (OUTPATIENT)
Dept: LAB | Facility: HOSPITAL | Age: 89
End: 2024-02-06
Payer: MEDICARE

## 2024-02-06 ENCOUNTER — OFFICE VISIT (OUTPATIENT)
Dept: NEUROLOGY | Facility: CLINIC | Age: 89
End: 2024-02-06
Payer: MEDICARE

## 2024-02-06 VITALS
OXYGEN SATURATION: 97 % | SYSTOLIC BLOOD PRESSURE: 110 MMHG | BODY MASS INDEX: 20.33 KG/M2 | WEIGHT: 122 LBS | HEIGHT: 65 IN | HEART RATE: 68 BPM | DIASTOLIC BLOOD PRESSURE: 70 MMHG

## 2024-02-06 DIAGNOSIS — G62.9 POLYNEUROPATHY: Primary | ICD-10-CM

## 2024-02-06 DIAGNOSIS — R26.89 IMBALANCE: ICD-10-CM

## 2024-02-06 DIAGNOSIS — G62.9 POLYNEUROPATHY: ICD-10-CM

## 2024-02-06 DIAGNOSIS — E83.10 DISORDER OF IRON METABOLISM, UNSPECIFIED: ICD-10-CM

## 2024-02-06 LAB
ALBUMIN SERPL-MCNC: 4.3 G/DL (ref 3.5–5.2)
ALBUMIN/GLOB SERPL: 1.6 G/DL
ALP SERPL-CCNC: 75 U/L (ref 39–117)
ALT SERPL W P-5'-P-CCNC: 12 U/L (ref 1–33)
ANION GAP SERPL CALCULATED.3IONS-SCNC: 11.5 MMOL/L (ref 5–15)
AST SERPL-CCNC: 21 U/L (ref 1–32)
BILIRUB SERPL-MCNC: 0.6 MG/DL (ref 0–1.2)
BUN SERPL-MCNC: 15 MG/DL (ref 8–23)
BUN/CREAT SERPL: 18.3 (ref 7–25)
CALCIUM SPEC-SCNC: 8.5 MG/DL (ref 8.6–10.5)
CHLORIDE SERPL-SCNC: 100 MMOL/L (ref 98–107)
CO2 SERPL-SCNC: 25.5 MMOL/L (ref 22–29)
CREAT SERPL-MCNC: 0.82 MG/DL (ref 0.57–1)
EGFRCR SERPLBLD CKD-EPI 2021: 69.3 ML/MIN/1.73
GLOBULIN UR ELPH-MCNC: 2.7 GM/DL
GLUCOSE SERPL-MCNC: 89 MG/DL (ref 65–99)
HBA1C MFR BLD: 5.3 % (ref 4.8–5.6)
POTASSIUM SERPL-SCNC: 4 MMOL/L (ref 3.5–5.2)
PROT SERPL-MCNC: 7 G/DL (ref 6–8.5)
SODIUM SERPL-SCNC: 137 MMOL/L (ref 136–145)
T4 FREE SERPL-MCNC: 0.84 NG/DL (ref 0.93–1.7)
TSH SERPL DL<=0.05 MIU/L-ACNC: 4.34 UIU/ML (ref 0.27–4.2)
VIT B12 BLD-MCNC: 463 PG/ML (ref 211–946)

## 2024-02-06 PROCEDURE — 83921 ORGANIC ACID SINGLE QUANT: CPT

## 2024-02-06 PROCEDURE — 80053 COMPREHEN METABOLIC PANEL: CPT

## 2024-02-06 PROCEDURE — 1160F RVW MEDS BY RX/DR IN RCRD: CPT | Performed by: PSYCHIATRY & NEUROLOGY

## 2024-02-06 PROCEDURE — 1159F MED LIST DOCD IN RCRD: CPT | Performed by: PSYCHIATRY & NEUROLOGY

## 2024-02-06 PROCEDURE — 84443 ASSAY THYROID STIM HORMONE: CPT | Performed by: PSYCHIATRY & NEUROLOGY

## 2024-02-06 PROCEDURE — 36415 COLL VENOUS BLD VENIPUNCTURE: CPT

## 2024-02-06 PROCEDURE — 82607 VITAMIN B-12: CPT

## 2024-02-06 PROCEDURE — 84165 PROTEIN E-PHORESIS SERUM: CPT | Performed by: PSYCHIATRY & NEUROLOGY

## 2024-02-06 PROCEDURE — 83036 HEMOGLOBIN GLYCOSYLATED A1C: CPT

## 2024-02-06 PROCEDURE — 99205 OFFICE O/P NEW HI 60 MIN: CPT | Performed by: PSYCHIATRY & NEUROLOGY

## 2024-02-06 PROCEDURE — 84439 ASSAY OF FREE THYROXINE: CPT | Performed by: PSYCHIATRY & NEUROLOGY

## 2024-02-06 NOTE — PROGRESS NOTES
Notes by LPN:  Patient referred for dizziness. Has had it for a few years but getting worse. She has had vertigo before. Patient has her  with her and give permission to discuss her medical history with him.    Orthostatic BP    Layin/68 P 61 O2 98  Sittin/74 P 64 O2 98  Standin/68 P 74 O2 97  Standing x 1 min: 110/72 P 73 O2 97            Subjective:     Dear Dr. Jones, thank you for referring Mrs. Arceo for neurological consultation.  As you know, she is an 88-year-old woman sent for evaluation of sensation of dizziness and imbalance and frequent falls.  She is fallen and hit her head on a number of occasions.  She feels like she is off balance when she stands up.  When she lays down she is fine.  No vertigo.  She has had vertigo in the past but the sensations are different.  She is on anticoagulation.  She has completed physical therapy and balance therapy on 3 different occasions.  She tells me that she has been diagnosed with neuropathy by her podiatrist.  Orthostatic blood pressure testing today reveals a drop in systolic blood pressure to 98 in the standing position down from 120 in the sitting position.  Please see accompanying data.  Patient ID: Becky Arceo is a 88 y.o. female.    Dizziness  Associated symptoms include numbness. Pertinent negatives include no abdominal pain, arthralgias, chest pain, fatigue, headaches, joint swelling, myalgias, nausea, neck pain, vomiting or weakness.     The following portions of the patient's history were reviewed and updated as appropriate: allergies, current medications, past family history, past medical history, past social history, past surgical history, and problem list.    Review of Systems   Constitutional:  Negative for activity change, appetite change and fatigue.   HENT:  Negative for facial swelling, trouble swallowing and voice change.    Eyes:  Negative for photophobia, pain and visual disturbance.   Respiratory:  Negative for  chest tightness, shortness of breath and wheezing.    Cardiovascular:  Negative for chest pain, palpitations and leg swelling.   Gastrointestinal:  Negative for abdominal pain, nausea and vomiting.   Endocrine: Negative for cold intolerance and heat intolerance.   Musculoskeletal:  Positive for gait problem. Negative for arthralgias, back pain, joint swelling, myalgias, neck pain and neck stiffness.   Neurological:  Positive for dizziness, light-headedness and numbness. Negative for tremors, seizures, syncope, facial asymmetry, speech difficulty, weakness and headaches.   Hematological:  Bruises/bleeds easily.   Psychiatric/Behavioral:  Negative for agitation, behavioral problems, confusion, decreased concentration, dysphoric mood, hallucinations, self-injury, sleep disturbance and suicidal ideas. The patient is not nervous/anxious and is not hyperactive.         Objective:    Neurologic Exam  Awake alert pleasant cooperative.  Speech is fluent and clear.  Speech comprehension is reasonable.  Normal social demeanor.    Cranial nerves II through XII normal and symmetric.    Motor exam reveals reasonable and symmetric tone bulk and power.    Sensory exam reveals a decrease in vibration sensation from the knees distally bilaterally and symmetrically with a modest decrease in light touch sensation distally bilaterally.    Coordination testing reveals reasonable finger-nose-finger bilaterally and reasonable toe taps bilaterally but significant axial ataxia with a positive Romberg and a wide-based gait.  She cannot tandem gait.  No parkinsonian features.    Tendon reflexes are 2+ and symmetric in the arms, 1+ at the patellar tendons and absent at the ankles with mute plantar responses.  Physical Exam  Neck is supple.  No cervical bruits.  Assessment/Plan:     Diagnoses and all orders for this visit:    1. Polyneuropathy (Primary)  -     CT Head Without Contrast; Future  -     Vitamin B12; Future  -     Methylmalonic Acid,  Serum; Future  -     TSH+Free T4  -     Comprehensive Metabolic Panel; Future  -     Protein Elec + Interp, Serum  -     Hemoglobin A1c; Future    2. Imbalance     88-year-old woman with imbalance and falls.  I think there are multiple contributions to her imbalance and falls, not the least of which is orthostatic hypotension.  I discussed this with her at length.  She has very little fluid intake and I strongly encouraged an increase in fluids throughout the day as well as increased salt intake and a slower change in postures.  I also think there is a contribution from peripheral polyneuropathy in the form of sensory ataxia.  She has already appropriately been through physical therapy/balance therapy on multiple occasions with some benefit.  I think she would do better with a cane and I strongly encouraged that.  Because of her multiple falls and anticoagulation I am arranging for a CT of the head to exclude a slowly developing subdural hematoma that may also be contributing here.  Finally, arranging for a battery of test for etiologies for her polyneuropathy and we will review all of the above and take any further measures that may be necessary.  Thank you very much for the opportunity to participate in her care.    65 minutes total patient care time including record review, examination, discussion and counseling, , and documentation.

## 2024-02-07 LAB
ALBUMIN SERPL ELPH-MCNC: 4 G/DL (ref 2.9–4.4)
ALBUMIN/GLOB SERPL: 1.3 {RATIO} (ref 0.7–1.7)
ALPHA1 GLOB SERPL ELPH-MCNC: 0.3 G/DL (ref 0–0.4)
ALPHA2 GLOB SERPL ELPH-MCNC: 0.7 G/DL (ref 0.4–1)
B-GLOBULIN SERPL ELPH-MCNC: 1.2 G/DL (ref 0.7–1.3)
GAMMA GLOB SERPL ELPH-MCNC: 0.9 G/DL (ref 0.4–1.8)
GLOBULIN SER CALC-MCNC: 3 G/DL (ref 2.2–3.9)
LABORATORY COMMENT REPORT: NORMAL
M PROTEIN SERPL ELPH-MCNC: NORMAL G/DL
PROT PATTERN SERPL ELPH-IMP: NORMAL
PROT SERPL-MCNC: 7 G/DL (ref 6–8.5)

## 2024-02-09 ENCOUNTER — TELEPHONE (OUTPATIENT)
Dept: NEUROLOGY | Facility: CLINIC | Age: 89
End: 2024-02-09
Payer: MEDICARE

## 2024-02-09 NOTE — TELEPHONE ENCOUNTER
----- Message from Syed Frank MD sent at 2/9/2024  1:09 PM EST -----  Lab tests look okay except for mild low thyroid.  Recommend discussing with PCP.

## 2024-02-12 ENCOUNTER — HOSPITAL ENCOUNTER (OUTPATIENT)
Dept: CT IMAGING | Facility: HOSPITAL | Age: 89
Discharge: HOME | End: 2024-02-12
Admitting: PSYCHIATRY & NEUROLOGY
Payer: MEDICARE

## 2024-02-12 DIAGNOSIS — G62.9 POLYNEUROPATHY: ICD-10-CM

## 2024-02-12 LAB — METHYLMALONATE SERPL-SCNC: 281 NMOL/L (ref 0–378)

## 2024-02-12 PROCEDURE — 70450 CT HEAD/BRAIN W/O DYE: CPT

## 2024-02-16 ENCOUNTER — TELEPHONE (OUTPATIENT)
Dept: NEUROLOGY | Facility: CLINIC | Age: 89
End: 2024-02-16
Payer: MEDICARE

## 2024-02-23 ENCOUNTER — TELEPHONE (OUTPATIENT)
Dept: NEUROLOGY | Facility: CLINIC | Age: 89
End: 2024-02-23
Payer: MEDICARE

## 2024-02-23 NOTE — TELEPHONE ENCOUNTER
Called to verify if Pt received prior message regarding CT results. Head CT looks okay.  No bleeding. V/u

## 2024-02-23 NOTE — TELEPHONE ENCOUNTER
----- Message from Syed Frank MD sent at 2/16/2024  1:31 PM EST -----  Head CT looks okay.  No bleeding.

## 2024-05-07 ENCOUNTER — OFFICE VISIT (OUTPATIENT)
Dept: NEUROLOGY | Facility: CLINIC | Age: 89
End: 2024-05-07
Payer: MEDICARE

## 2024-05-07 VITALS
HEART RATE: 64 BPM | OXYGEN SATURATION: 95 % | BODY MASS INDEX: 20.49 KG/M2 | SYSTOLIC BLOOD PRESSURE: 120 MMHG | HEIGHT: 65 IN | DIASTOLIC BLOOD PRESSURE: 64 MMHG | WEIGHT: 123 LBS

## 2024-05-07 DIAGNOSIS — G62.9 POLYNEUROPATHY: ICD-10-CM

## 2024-05-07 DIAGNOSIS — R26.89 IMBALANCE: Primary | ICD-10-CM

## 2024-05-07 PROCEDURE — 1160F RVW MEDS BY RX/DR IN RCRD: CPT | Performed by: PSYCHIATRY & NEUROLOGY

## 2024-05-07 PROCEDURE — 99214 OFFICE O/P EST MOD 30 MIN: CPT | Performed by: PSYCHIATRY & NEUROLOGY

## 2024-05-07 PROCEDURE — 1159F MED LIST DOCD IN RCRD: CPT | Performed by: PSYCHIATRY & NEUROLOGY

## 2024-05-31 ENCOUNTER — TRANSCRIBE ORDERS (OUTPATIENT)
Dept: ADMINISTRATIVE | Facility: HOSPITAL | Age: 89
End: 2024-05-31
Payer: MEDICARE

## 2024-05-31 DIAGNOSIS — Z12.31 VISIT FOR SCREENING MAMMOGRAM: Primary | ICD-10-CM

## 2024-07-25 ENCOUNTER — HOSPITAL ENCOUNTER (OUTPATIENT)
Dept: MAMMOGRAPHY | Facility: HOSPITAL | Age: 89
Discharge: HOME | End: 2024-07-25
Admitting: FAMILY MEDICINE
Payer: MEDICARE

## 2024-07-25 DIAGNOSIS — Z12.31 VISIT FOR SCREENING MAMMOGRAM: ICD-10-CM

## 2024-07-25 PROCEDURE — 77063 BREAST TOMOSYNTHESIS BI: CPT

## 2024-07-25 PROCEDURE — 77067 SCR MAMMO BI INCL CAD: CPT

## 2025-06-06 ENCOUNTER — TRANSCRIBE ORDERS (OUTPATIENT)
Dept: ADMINISTRATIVE | Facility: HOSPITAL | Age: 89
End: 2025-06-06
Payer: MEDICARE

## 2025-06-06 DIAGNOSIS — Z12.31 ENCOUNTER FOR SCREENING MAMMOGRAM FOR MALIGNANT NEOPLASM OF BREAST: Primary | ICD-10-CM
